# Patient Record
Sex: FEMALE | Race: WHITE | Employment: FULL TIME
[De-identification: names, ages, dates, MRNs, and addresses within clinical notes are randomized per-mention and may not be internally consistent; named-entity substitution may affect disease eponyms.]

---

## 2017-02-01 ENCOUNTER — TELEPHONE (OUTPATIENT)
Dept: CARDIOLOGY | Facility: CLINIC | Age: 57
End: 2017-02-01

## 2017-05-22 ENCOUNTER — OFFICE VISIT (OUTPATIENT)
Dept: CARDIOLOGY | Age: 57
End: 2017-05-22
Payer: COMMERCIAL

## 2017-05-22 VITALS
OXYGEN SATURATION: 98 % | HEIGHT: 68 IN | DIASTOLIC BLOOD PRESSURE: 75 MMHG | WEIGHT: 177.4 LBS | HEART RATE: 87 BPM | SYSTOLIC BLOOD PRESSURE: 114 MMHG | BODY MASS INDEX: 26.89 KG/M2

## 2017-05-22 DIAGNOSIS — I25.10 ASHD (ARTERIOSCLEROTIC HEART DISEASE): Primary | ICD-10-CM

## 2017-05-22 DIAGNOSIS — E78.5 DYSLIPIDEMIA: ICD-10-CM

## 2017-05-22 DIAGNOSIS — Z82.49 FAMILY HISTORY OF PREMATURE CAD: ICD-10-CM

## 2017-05-22 DIAGNOSIS — Z95.820 S/P ANGIOPLASTY WITH STENT: ICD-10-CM

## 2017-05-22 PROCEDURE — 93000 ELECTROCARDIOGRAM COMPLETE: CPT | Performed by: INTERNAL MEDICINE

## 2017-05-22 PROCEDURE — 99214 OFFICE O/P EST MOD 30 MIN: CPT | Performed by: INTERNAL MEDICINE

## 2017-05-22 RX ORDER — ROSUVASTATIN CALCIUM 40 MG/1
40 TABLET, COATED ORAL EVERY EVENING
Qty: 30 TABLET | Refills: 3 | Status: SHIPPED | OUTPATIENT
Start: 2017-05-22 | End: 2017-09-26 | Stop reason: CLARIF

## 2017-09-26 ENCOUNTER — TELEPHONE (OUTPATIENT)
Dept: CARDIOLOGY | Age: 57
End: 2017-09-26

## 2017-09-26 RX ORDER — ATORVASTATIN CALCIUM 40 MG/1
40 TABLET, FILM COATED ORAL DAILY
Qty: 90 TABLET | Refills: 3 | Status: SHIPPED | OUTPATIENT
Start: 2017-09-26 | End: 2018-08-09 | Stop reason: SDUPTHER

## 2017-11-14 RX ORDER — CLOPIDOGREL BISULFATE 75 MG/1
TABLET ORAL
Qty: 90 TABLET | Refills: 3 | Status: SHIPPED | OUTPATIENT
Start: 2017-11-14 | End: 2018-11-08 | Stop reason: SDUPTHER

## 2018-05-21 ENCOUNTER — OFFICE VISIT (OUTPATIENT)
Dept: CARDIOLOGY | Age: 58
End: 2018-05-21
Payer: COMMERCIAL

## 2018-05-21 VITALS
BODY MASS INDEX: 28.6 KG/M2 | OXYGEN SATURATION: 98 % | HEIGHT: 67 IN | HEART RATE: 75 BPM | SYSTOLIC BLOOD PRESSURE: 126 MMHG | RESPIRATION RATE: 20 BRPM | DIASTOLIC BLOOD PRESSURE: 83 MMHG | WEIGHT: 182.2 LBS

## 2018-05-21 DIAGNOSIS — Z95.820 S/P ANGIOPLASTY WITH STENT: ICD-10-CM

## 2018-05-21 DIAGNOSIS — I25.10 CORONARY ARTERY DISEASE INVOLVING NATIVE CORONARY ARTERY OF NATIVE HEART WITHOUT ANGINA PECTORIS: Primary | ICD-10-CM

## 2018-05-21 DIAGNOSIS — E78.5 HYPERLIPIDEMIA WITH TARGET LDL LESS THAN 100: ICD-10-CM

## 2018-05-21 PROCEDURE — 93000 ELECTROCARDIOGRAM COMPLETE: CPT | Performed by: INTERNAL MEDICINE

## 2018-05-21 PROCEDURE — 99213 OFFICE O/P EST LOW 20 MIN: CPT | Performed by: INTERNAL MEDICINE

## 2018-05-21 RX ORDER — METOPROLOL SUCCINATE 25 MG/1
12.5 TABLET, EXTENDED RELEASE ORAL DAILY
Qty: 90 TABLET | Refills: 3 | Status: SHIPPED | OUTPATIENT
Start: 2018-05-21 | End: 2019-08-12 | Stop reason: SDUPTHER

## 2018-05-21 RX ORDER — NITROGLYCERIN 0.4 MG/1
0.4 TABLET SUBLINGUAL EVERY 5 MIN PRN
Qty: 25 TABLET | Refills: 1 | Status: SHIPPED | OUTPATIENT
Start: 2018-05-21 | End: 2022-10-12 | Stop reason: SDUPTHER

## 2018-05-24 ENCOUNTER — HOSPITAL ENCOUNTER (OUTPATIENT)
Age: 58
Discharge: HOME | End: 2018-05-24
Payer: COMMERCIAL

## 2018-05-24 DIAGNOSIS — Z95.820 S/P ANGIOPLASTY WITH STENT: ICD-10-CM

## 2018-05-24 DIAGNOSIS — I25.10 CORONARY ARTERY DISEASE INVOLVING NATIVE CORONARY ARTERY OF NATIVE HEART WITHOUT ANGINA PECTORIS: ICD-10-CM

## 2018-05-24 DIAGNOSIS — E78.5 HYPERLIPIDEMIA WITH TARGET LDL LESS THAN 100: ICD-10-CM

## 2018-05-24 LAB
ANION GAP SERPL CALCULATED.3IONS-SCNC: 8 MMOL/L (ref 9–17)
BUN BLDV-MCNC: 15 MG/DL (ref 6–20)
BUN/CREAT BLD: 21 (ref 9–20)
CALCIUM SERPL-MCNC: 9.7 MG/DL (ref 8.6–10.4)
CHLORIDE BLD-SCNC: 99 MMOL/L (ref 98–107)
CHOLESTEROL/HDL RATIO: 3.6
CHOLESTEROL: 186 MG/DL
CO2: 30 MMOL/L (ref 20–31)
CREAT SERPL-MCNC: 0.71 MG/DL (ref 0.5–0.9)
GFR AFRICAN AMERICAN: >60 ML/MIN
GFR NON-AFRICAN AMERICAN: >60 ML/MIN
GFR SERPL CREATININE-BSD FRML MDRD: ABNORMAL ML/MIN/{1.73_M2}
GFR SERPL CREATININE-BSD FRML MDRD: ABNORMAL ML/MIN/{1.73_M2}
GLUCOSE BLD-MCNC: 106 MG/DL (ref 70–99)
HCT VFR BLD CALC: 41.4 % (ref 36.3–47.1)
HDLC SERPL-MCNC: 51 MG/DL
HEMOGLOBIN: 13.5 G/DL (ref 11.9–15.1)
LDL CHOLESTEROL: 119 MG/DL (ref 0–130)
MCH RBC QN AUTO: 29.7 PG (ref 25.2–33.5)
MCHC RBC AUTO-ENTMCNC: 32.6 G/DL (ref 28.4–34.8)
MCV RBC AUTO: 91.2 FL (ref 82.6–102.9)
NRBC AUTOMATED: 0 PER 100 WBC
PDW BLD-RTO: 12.7 % (ref 11.8–14.4)
PLATELET # BLD: 226 K/UL (ref 138–453)
PMV BLD AUTO: 9.9 FL (ref 8.1–13.5)
POTASSIUM SERPL-SCNC: 4.2 MMOL/L (ref 3.7–5.3)
RBC # BLD: 4.54 M/UL (ref 3.95–5.11)
SODIUM BLD-SCNC: 137 MMOL/L (ref 135–144)
TRIGL SERPL-MCNC: 80 MG/DL
VLDLC SERPL CALC-MCNC: NORMAL MG/DL (ref 1–30)
WBC # BLD: 5.5 K/UL (ref 3.5–11.3)

## 2018-05-24 PROCEDURE — 80061 LIPID PANEL: CPT

## 2018-05-24 PROCEDURE — 85027 COMPLETE CBC AUTOMATED: CPT

## 2018-05-24 PROCEDURE — 80048 BASIC METABOLIC PNL TOTAL CA: CPT

## 2018-05-24 PROCEDURE — 36415 COLL VENOUS BLD VENIPUNCTURE: CPT

## 2018-06-11 ENCOUNTER — TELEPHONE (OUTPATIENT)
Dept: CARDIOLOGY | Age: 58
End: 2018-06-11

## 2018-06-19 ENCOUNTER — TELEPHONE (OUTPATIENT)
Dept: CARDIOLOGY | Age: 58
End: 2018-06-19

## 2018-08-09 RX ORDER — ATORVASTATIN CALCIUM 40 MG/1
40 TABLET, FILM COATED ORAL DAILY
Qty: 90 TABLET | Refills: 3 | Status: SHIPPED | OUTPATIENT
Start: 2018-08-09 | End: 2018-11-08 | Stop reason: SDUPTHER

## 2018-11-08 RX ORDER — ATORVASTATIN CALCIUM 80 MG/1
80 TABLET, FILM COATED ORAL DAILY
Qty: 90 TABLET | Refills: 3 | Status: SHIPPED | OUTPATIENT
Start: 2018-11-08 | End: 2019-11-11 | Stop reason: SDUPTHER

## 2018-11-08 RX ORDER — CLOPIDOGREL BISULFATE 75 MG/1
TABLET ORAL
Qty: 90 TABLET | Refills: 3 | Status: SHIPPED | OUTPATIENT
Start: 2018-11-08 | End: 2019-11-11 | Stop reason: SDUPTHER

## 2019-04-23 ENCOUNTER — OFFICE VISIT (OUTPATIENT)
Dept: CARDIOLOGY | Age: 59
End: 2019-04-23
Payer: COMMERCIAL

## 2019-04-23 VITALS
RESPIRATION RATE: 18 BRPM | HEART RATE: 85 BPM | OXYGEN SATURATION: 97 % | BODY MASS INDEX: 26.21 KG/M2 | WEIGHT: 167 LBS | DIASTOLIC BLOOD PRESSURE: 76 MMHG | SYSTOLIC BLOOD PRESSURE: 111 MMHG | HEIGHT: 67 IN

## 2019-04-23 DIAGNOSIS — R55 SYNCOPE, UNSPECIFIED SYNCOPE TYPE: ICD-10-CM

## 2019-04-23 DIAGNOSIS — Z82.49 FAMILY HISTORY OF CORONARY ARTERIOSCLEROSIS: ICD-10-CM

## 2019-04-23 DIAGNOSIS — I10 ORTHOSTATIC HYPERTENSION: Primary | ICD-10-CM

## 2019-04-23 DIAGNOSIS — R00.2 PALPITATIONS: ICD-10-CM

## 2019-04-23 DIAGNOSIS — Z95.820 S/P ANGIOPLASTY WITH STENT: ICD-10-CM

## 2019-04-23 DIAGNOSIS — E78.2 MIXED HYPERLIPIDEMIA: ICD-10-CM

## 2019-04-23 DIAGNOSIS — I25.10 ASHD (ARTERIOSCLEROTIC HEART DISEASE): ICD-10-CM

## 2019-04-23 PROCEDURE — 99214 OFFICE O/P EST MOD 30 MIN: CPT | Performed by: INTERNAL MEDICINE

## 2019-04-23 PROCEDURE — 93000 ELECTROCARDIOGRAM COMPLETE: CPT | Performed by: INTERNAL MEDICINE

## 2019-04-23 NOTE — PROGRESS NOTES
Syed Young am scribing for and in the presence of Alberto Higgins MD, F.A.C.C. Patient: Brandin Bliss  : 1960  Date of Visit: 2019    REASON FOR VISIT / CONSULTATION:   Chief Complaint   Patient presents with    Follow-up     HX: CAD S/p Stent, Hyperlipidemia. Pt here for syncope. Pt staes she is doing ok. She was standing doing dishes and she felt dizzy in a way and down she went. Her feriance caugt her so she did not hit her head. Her BP was 109/50. Last time she passed out she was a teenager. When she came to her heart racing. Deneis: CP, Palpitioatns, Lightreaded/dizziness, SOB. I had the pleasure of seeing Brandin Bliss today who comes in for follow up of her coronary artery disease     She is s/p PCI with HITESH to left anterior descending coronary artery and LCx in 2013 for unstable angina. Symptoms prior to her PCI included recurrent chest pain. She did well since her PCI. Working full time. Compliant with medications. History of intolerance to Crestor causing muscle pain. Tolerating Lipitor without significant problem. She has no history of MI or CHF. She is here for her  follow up after syncope episode. She states she passed out completely on 19 after dinner while doing dishes, her fiance caught her and when she woke up her heart was rapidly beating. She denies any chest pain, vomiting, seizure activity, loss of bladder or bowel continence. He also denied biting her tongue. After she woke up, her fiancé took her blood pressure 109/50 and Blood Sugar was 149. No further passing out episodes since that time. No significant dizziness or lightheadedness. No Chest pain, pressure or tightness. No significant shortness of breath. No problems with medications. She states that she is drinking enough fluids. ECG showed  showed normal sinus rhythm, no ischemic changes. Positive orthostatic  blood pressure changes in his office today.     Past Medical History:   Diagnosis Date    Abnormal cardiac cath 10/3/13    LMCA:  Mild [id number]%. LAD:  Abnormal.  Hazy 60-70% proximal LAD stenosis at the bifurcation of a moderate sized septal peforator vessel. LCx:  Abnormal.  80-90% proximal circumflex stenosis. .  RCA:  Abnormal.  90% ostial stenosis in a relatively small and distal acute marginal branch of the RCA.  Angina pectoris ([address]     CAD (coronary artery disease), native coronary artery 10/3/13    90% Proximal Cx. 70% proximal LAD. 80% small marginal branch stenosis of the RCA.  Esophageal reflux     H/O cardiovascular stress test 9/20/13    Probably normal myocardial perfusion imaging with soft tissue artifact but without evidence of significant MI or infarction. LV systolic function was normal without regional wall motion abnormalities. EF 66%. significant electrocardiographic evidence of MI during EKG monitoring without significant associated arrhythmias. Maximum ST depression was 1.75 mm in lead V4. Duke Treadmill score is 0.    H/O cardiovascular stress test 9/20/13    which correlates with an intermediate risk for CAD.  History of echocardiogram 05/18/15    EF 60%. LV cavity size is normal. No wall motion abnormalities or valvular abnormalities. No evidence of diastolic dysfunction was seen.  Hypercholesterolemia     Impaired fasting glucose     S/P angioplasty 10/4/13    Drug Eluting Stent. LAD and/or branches, CX and /or branches.    .     Social History     Tobacco Use    Smoking status: Never Smoker    Smokeless tobacco: Never Used   Substance Use Topics    Alcohol use: No    Drug use: No     No significant family history of recurrent syncope       Current Outpatient Medications:     clopidogrel (PLAVIX) 75 MG tablet, TAKE ONE TABLET BY MOUTH ONCE DAILY, Disp: 90 tablet, Rfl: 3    atorvastatin (LIPITOR) 80 MG tablet, Take 1 tablet by mouth daily, Disp: 90 tablet, Rfl: 3    nitroGLYCERIN (NITROSTAT) 0.4 MG SL tablet, Place 1 tablet under the tongue every 5 minutes as needed (Take when needed), Disp: 25 tablet, Rfl: 1    metoprolol succinate (TOPROL XL) 25 MG extended release tablet, Take 0.5 tablets by mouth daily, Disp: 90 tablet, Rfl: 3    aspirin 81 MG tablet, Take 81 mg by mouth daily. , Disp: , Rfl:      Allergies   Allergen Reactions    Crestor [Rosuvastatin Calcium] Other (See Comments)     Body aches       ROS: 14 systems reviewed and negative except for pertinent positives as noted above. Vitals:    04/23/19 0949   BP: 111/76   Pulse: 85   Resp:    SpO2:         Body mass index is 26.16 kg/m². Constitutional: She is oriented to person, place, and time. She appears well-developed and well-nourished. In no acute distress. HEENT: Normocephalic and atraumatic. No JVD present. Carotid bruit is not present. No mass and no thyromegaly present. No lymphadenopathy present. Cardiovascular: Normal rate, regular rhythm, normal heart sounds. Exam reveals no gallop and no friction rubs. No murmur was heard. .  Pulmonary/Chest: Effort normal and breath sounds normal. No respiratory distress. She has no wheezes, rhonchi or rales. Abdominal: Soft, non-tender. Bowel sounds and aorta are normal. She exhibits no organomegaly, mass or bruit. Extremities: None. No cyanosis or clubbing. 2+ radial and carotid pulses. Distal extremity pulses: 2+ bilaterally. Neurological: She is alert and oriented to person, place, and time. No evidence of gross cranial nerve deficit. Coordination appeared normal.   Skin: Skin is warm and dry. There is no rash or diaphoresis. Psychiatric: She has a normal mood and affect. Her speech is normal and behavior is normal.      Lab reviewed. Diagnosis Orders   1. Orthostatic hypertension  Echo 2D w doppler w color complete    Tilt table test    Stress test myoview    Lipid Panel   2.  Syncope, unspecified syncope type  Echo 2D w doppler w color complete    Tilt table test    Stress test myoview    Lipid Panel   3. ASHD (arteriosclerotic heart disease)  EKG 12 lead    Echo 2D w doppler w color complete    Tilt table test    Stress test myoview    Lipid Panel   4. Palpitations  Echo 2D w doppler w color complete    Tilt table test    Stress test myoview    Lipid Panel   5. Mixed hyperlipidemia  Echo 2D w doppler w color complete    Tilt table test    Stress test myoview    Lipid Panel   6. Family history of coronary arteriosclerosis  Lipid Panel   7. S/P angioplasty with stent  Lipid Panel         Plan:    · History of syncope, probably secondary to orthostatic hypotension. · Positive orthostatic blood pressure changes in the office today. · ECG today showed no acute ischemic changes. · Giving patient history of   CAD further cardiac workup is needed as outlined below. · Because of her intermittent lightheadedness and dizziness, I reminded her to try and keep herself well-hydrated and to take extra time when moving from laying to sitting, sitting to standing and standing to walking. · Additional Testing: I Ordered an Echocardiogram to assess Ms. Arauz's ejection fraction and to look for significant valvular heart disease as a source of Ms. Arauz symptoms  · Additional Testing: I ordered a tilt table test to try and better assess the etiology of Ms. Arauz's recent symptoms   · I will also order a treadmill Myoview stress test to rule out ischemic etiology of her most recent episode of syncope      Atherosclerotic Heart Disease: S/P Stents  Antiplatelet Agent: Continue Aspirin 81 mg daily and clopidogrel (Plavix) 75 mg daily. I also reminded her to watch for signs of blood in her stool or black tarry stools and stop the medication immediately if this develops as this could be life threatening.    Beta Blocker: Continue metoprolol succinate (Toprol XL) 12.5 mg nightly  I also discussed the potential side effects of this medication including lightheadedness and dizziness and told her to stop the medication of this occurs and call our office if this occurs. Statin Therapy: Continue atorvastatin (Lipitor) 40 mg nightly. Heart palpitations: Stress/Anxiety Induced. · Beta Blocker: Continue metoprolol succinate (Toprol XL) 12.5 mg nightly        · Hyperlipidemia: Mixed  · Statin Therapy: Continue atorvastatin (Lipitor) 40 mg nightly. · PCSK9 inhibitors: Not indicated at this time    Finally, I recommended that she continue her other medications and follow up with you as previously scheduled. FOLLOW UP:  I told Ms. Lis Deleon to call my office if she had any problems, but otherwise told her to Return in about 1 week (around 4/30/2019). However, I would be happy to see her sooner should the need arise. Sincerely,  Dr. Meryl Herron Dr [facility], [address] [place] [address]  Phone: [phone]; Fax: [phone]    I believe that the risk of significant morbidity and mortality related to the patient's current medical conditions are: intermediate-high. The documentation recorded by the scribe, accurately and completely reflects the services I personally performed and the decisions made by me. Shola Coy MD, F.A.C.C.  April 23, 2019

## 2019-04-23 NOTE — PATIENT INSTRUCTIONS
SURVEY:    You may be receiving a survey from [employer] regarding your visit today. Please complete the survey to enable us to provide the highest quality of care to you and your family. If you cannot score us a very good on any question, please call the office to discuss how we could have made your experience a very good one. Thank you.

## 2019-04-24 ENCOUNTER — HOSPITAL ENCOUNTER (OUTPATIENT)
Dept: NON INVASIVE DIAGNOSTICS | Age: 59
Discharge: HOME | End: 2019-04-24
Payer: COMMERCIAL

## 2019-04-24 DIAGNOSIS — R00.2 PALPITATIONS: ICD-10-CM

## 2019-04-24 DIAGNOSIS — I25.10 ASHD (ARTERIOSCLEROTIC HEART DISEASE): ICD-10-CM

## 2019-04-24 DIAGNOSIS — R55 SYNCOPE, UNSPECIFIED SYNCOPE TYPE: ICD-10-CM

## 2019-04-24 DIAGNOSIS — E78.2 MIXED HYPERLIPIDEMIA: ICD-10-CM

## 2019-04-24 DIAGNOSIS — I10 ORTHOSTATIC HYPERTENSION: ICD-10-CM

## 2019-04-24 LAB
LV EF: 60 %
LVEF MODALITY: NORMAL

## 2019-04-24 PROCEDURE — A9500 TC99M SESTAMIBI: HCPCS | Performed by: INTERNAL MEDICINE

## 2019-04-24 PROCEDURE — 93018 CV STRESS TEST I&R ONLY: CPT | Performed by: INTERNAL MEDICINE

## 2019-04-24 PROCEDURE — 93017 CV STRESS TEST TRACING ONLY: CPT

## 2019-04-24 PROCEDURE — 3430000000 HC RX DIAGNOSTIC RADIOPHARMACEUTICAL: Performed by: INTERNAL MEDICINE

## 2019-04-24 PROCEDURE — 93660 TILT TABLE EVALUATION: CPT

## 2019-04-24 PROCEDURE — 78452 HT MUSCLE IMAGE SPECT MULT: CPT | Performed by: INTERNAL MEDICINE

## 2019-04-24 PROCEDURE — 93306 TTE W/DOPPLER COMPLETE: CPT

## 2019-04-24 PROCEDURE — 78452 HT MUSCLE IMAGE SPECT MULT: CPT

## 2019-04-24 PROCEDURE — 93016 CV STRESS TEST SUPVJ ONLY: CPT | Performed by: INTERNAL MEDICINE

## 2019-04-24 RX ADMIN — Medication 10 MILLICURIE: at 09:04

## 2019-04-24 RX ADMIN — Medication 30 MILLICURIE: at 09:04

## 2019-04-25 ENCOUNTER — TELEPHONE (OUTPATIENT)
Dept: CARDIOLOGY | Age: 59
End: 2019-04-25

## 2019-04-25 NOTE — TELEPHONE ENCOUNTER
----- Message from Laura Dutton MD sent at 4/24/2019 11:36 PM EDT -----  Test result normal.  No further action needed.

## 2019-04-25 NOTE — PROCEDURES
[address] [place], [place] [address]                              CARDIAC STRESS TEST    PATIENT NAME: Brianda Garcia                 :        1960  MED REC NO:   [id number]                              ROOM:  ACCOUNT NO:   [de-identified]                           ADMIT DATE: 2019  PROVIDER:     Tashia Gotti    CARDIOVASCULAR DIAGNOSTIC DEPARTMENT    DATE OF STUDY:  2019    ORDERING PROVIDER:  Tashia Gotti MD    PRIMARY CARE PROVIDER:  None    INTERPRETING PHYSICIAN:  Tashia Gotti MD    EXERCISE MYOCARDIAL PERFUSION STRESS TEST REPORT    Rest/stress single-isotope SPECT imaging with exercise stress and gated  SPECT imaging    INDICATION:  Assessment of a cardiac cause of:  Syncope    CLINICAL HISTORY:  The patient is a 63-year-old woman with known  coronary artery disease. Previous cardiac history includes:  CAD, stress test, cardiac  catheterization, PTCA    Other previous history includes:  Palpitations, lightheadedness,  syncope, hypotension    Symptoms just prior to testing included:  None    Relevant medications:  Metoprolol    PROCEDURE:  The patient performed treadmill exercise using a Bruno  protocol, completing 9:41 minutes and completing an estimated workload  of 56.36 metabolic equivalents (METS). The test was terminated due to fatigue and ST depression. The heart rate was 84 beats per minute at baseline and increased to 166  beats per minute at peak exercise, which was 103% of the maximum  predicted heart rate. The rest blood pressure was 106/60 mmHg and  increased to 150/70 mmHg, which is a normal response. During the  procedure, the patient developed fatigue and shortness of breath but  denied any chest discomfort. Myocardial perfusion imaging: Imaging was performed at rest 30-45  minutes following the injection of 10.0 mCi of sestamibi.   At peak  exercise, the patient was injected with 30.0 mCi of sestamibi and  exercise was continued for 1 minute. Gating post-stress tomographic  imaging was performed 30-45 minutes after stress. STRESS ECG RESULTS:  The resting electrocardiogram demonstrated normal  sinus rhythm without definitive ST-segment abnormalities suggestive of  myocardial ischemia. At peak exercise and during recovery, the patient developed:    Upsloping ST segment changes in lead(s) II, III, aVF, V4, V5, V6 which  did meet diagnostic criteria for myocardial ischemia with no premature  atrial contractions (PACs) and no premature ventricular contractions  (PVCs). NUCLEAR IMAGING RESULTS:  The overall quality of the study is fair. No  significant attenuation artifact was seen. There is no evidence of  abnormal lung uptake. Additionally, the right ventricle appears normal.  The left ventricular cavity is noted to be normal in size on the stress  images. There is no evidence of transient ischemic dilatation (TID) of  the left ventricle. Gated SPECT imaging reveals normal myocardial thickening and wall motion  with a calculated left ventricular ejection fraction of 68%. The rest images demonstrated a small/moderate perfusion abnormality of  mild intensity in the apical and anteroapical region(s) which is most  likely due to artifact. On stress imaging, a small/moderate perfusion abnormality of mild  intensity was noted in the apical and anteroapical region(s) which is  most likely due to artifact. IMPRESSION:  1. Largely normal myocardial perfusion imaging with soft tissue artifact  but without evidence of significant myocardial ischemia or infarction. 2.  Global left ventricular systolic function was normal with an EF of  68% without regional wall motion abnormalities. 3.  Significant electrocardiographic evidence of myocardial ischemia  during EKG monitoring without significant associated arrhythmias.     The patient's Duke Treadmill score is 0, which correlates with a low  risk for significant coronary artery disease. Overall, these results are most consistent with a low risk scan. Although the patient's results were not completely normal, unless  clinical suspicion for significant ongoing coronary artery ischemia is  high, I would not suggest pursuing additional testing by coronary  angiography. The sensitivity for detecting ischemia on this test may have been  reduced due to the patient being on a beta blocker. TERRI DAS    D: 04/25/2019 12:27:57       T: 04/25/2019 12:29:02     [email or web address]  Job#: [id number]     Doc#: Unknown

## 2019-04-25 NOTE — PROCEDURES
[address] [place], [place] [address]                                TILT TABLE TEST    PATIENT NAME: María Teresa                 :        1960  MED REC NO:   [id number]                              ROOM:  ACCOUNT NO:   [de-identified]                           ADMIT DATE: 2019  PROVIDER:     Sima Yao    Cardiovascular Diagnostics Department    DATE OF PROCEDURE:  2019    ORDERING PROVIDER:  Sima Yao MD    PRIMARY CARE PROVIDER:  None    INTERPRETING PHYSICIAN:  Sima Yao MD    Diagnosis:  Syncope    PROCEDURE SUMMARY:  After explaining the risk, benefits and alternatives  to the procedure, informed written consent was obtained. The patient  was brought to the tilt table laboratory in a fasting and resting state. The patient was placed on the tilt table in the supine position, ECG  patches were applied and an IV was placed. The patient's baseline blood  pressure was 120/84 mmHg with a heart rate of 75/minute. The patient  was then raised to the 70 degree head upright tilt position with and  pulse rate, blood pressure and cardiac rhythm were monitored and  recorded each minute of the study for a maximum of 30 minutes. During the initial 7 minutes of the study, the patient's blood pressure  ranged from a high of 121/76 mmHg to a low of 67/34 mmHg, while their  heart rate ranged from a low of 66/minute to a high of 90/minute. During this period, the patient reported moderate lightheadedness,  vision changes, feeling as if she were going to pass out . At this point, the patient was returned to the supine position and  monitored for an additional ten minutes. Once the patient felt well  enough to be discharged home, they were discharged home with  instructions to follow up with their primary care physician and/or  cardiologist as previously scheduled.     STUDY CONCLUSIONS:  Abnormal head upright tilt table study. The patient's heart rate, blood  pressure response and symptoms were most consistent with  neurocardiogenic dysfunction. Combined with vigilant maintenance of  euvolemia and maintaining a moderate salt intake, pharmacologic  treatment with Florinef and/or a serotonin selective reuptake inhibitor  (SSRI) such as Lexapro among other treatments have shown some  effectiveness in the treatment of this condition. TERRI DAS    D: 04/25/2019 12:33:23       T: 04/25/2019 12:33:59     [email or web address]  Job#: [id number]     Doc#: Unknown

## 2019-04-29 ENCOUNTER — TELEPHONE (OUTPATIENT)
Dept: CARDIOLOGY | Age: 59
End: 2019-04-29

## 2019-04-29 NOTE — TELEPHONE ENCOUNTER
----- Message from Ron Anderson MD sent at 4/27/2019  1:42 PM EDT -----  Stress test is normal. Tilt table test is abnormal. Will discuss on follow up.  Thank you

## 2019-04-30 ENCOUNTER — HOSPITAL ENCOUNTER (OUTPATIENT)
Age: 59
Discharge: HOME | End: 2019-04-30
Payer: COMMERCIAL

## 2019-04-30 ENCOUNTER — OFFICE VISIT (OUTPATIENT)
Dept: CARDIOLOGY | Age: 59
End: 2019-04-30
Payer: COMMERCIAL

## 2019-04-30 VITALS
WEIGHT: 169 LBS | HEART RATE: 76 BPM | DIASTOLIC BLOOD PRESSURE: 77 MMHG | OXYGEN SATURATION: 94 % | HEIGHT: 67 IN | SYSTOLIC BLOOD PRESSURE: 127 MMHG | RESPIRATION RATE: 18 BRPM | BODY MASS INDEX: 26.53 KG/M2

## 2019-04-30 DIAGNOSIS — R55 SYNCOPE, UNSPECIFIED SYNCOPE TYPE: Primary | ICD-10-CM

## 2019-04-30 DIAGNOSIS — I10 ORTHOSTATIC HYPERTENSION: ICD-10-CM

## 2019-04-30 DIAGNOSIS — I25.10 CORONARY ARTERY DISEASE INVOLVING NATIVE CORONARY ARTERY OF NATIVE HEART WITHOUT ANGINA PECTORIS: ICD-10-CM

## 2019-04-30 DIAGNOSIS — Z95.820 S/P ANGIOPLASTY WITH STENT: ICD-10-CM

## 2019-04-30 DIAGNOSIS — R55 SYNCOPE, UNSPECIFIED SYNCOPE TYPE: ICD-10-CM

## 2019-04-30 DIAGNOSIS — E78.5 HYPERLIPIDEMIA WITH TARGET LDL LESS THAN 100: ICD-10-CM

## 2019-04-30 LAB
ALBUMIN SERPL-MCNC: 4.3 G/DL (ref 3.5–5.2)
ALBUMIN/GLOBULIN RATIO: 1.3 (ref 1–2.5)
ALP BLD-CCNC: 88 U/L (ref 35–104)
ALT SERPL-CCNC: 27 U/L (ref 5–33)
ANION GAP SERPL CALCULATED.3IONS-SCNC: 10 MMOL/L (ref 9–17)
AST SERPL-CCNC: 22 U/L
BILIRUB SERPL-MCNC: 0.64 MG/DL (ref 0.3–1.2)
BUN BLDV-MCNC: 16 MG/DL (ref 6–20)
BUN/CREAT BLD: 20 (ref 9–20)
CALCIUM SERPL-MCNC: 9.7 MG/DL (ref 8.6–10.4)
CHLORIDE BLD-SCNC: 101 MMOL/L (ref 98–107)
CO2: 31 MMOL/L (ref 20–31)
CREAT SERPL-MCNC: 0.79 MG/DL (ref 0.5–0.9)
GFR AFRICAN AMERICAN: >60 ML/MIN
GFR NON-AFRICAN AMERICAN: >60 ML/MIN
GFR SERPL CREATININE-BSD FRML MDRD: NORMAL ML/MIN/{1.73_M2}
GFR SERPL CREATININE-BSD FRML MDRD: NORMAL ML/MIN/{1.73_M2}
GLUCOSE BLD-MCNC: 97 MG/DL (ref 70–99)
HCT VFR BLD CALC: 41 % (ref 36.3–47.1)
HEMOGLOBIN: 13.3 G/DL (ref 11.9–15.1)
MCH RBC QN AUTO: 30.4 PG (ref 25.2–33.5)
MCHC RBC AUTO-ENTMCNC: 32.4 G/DL (ref 28.4–34.8)
MCV RBC AUTO: 93.8 FL (ref 82.6–102.9)
NRBC AUTOMATED: 0 PER 100 WBC
PDW BLD-RTO: 12.8 % (ref 11.8–14.4)
PLATELET # BLD: 212 K/UL (ref 138–453)
PMV BLD AUTO: 9.9 FL (ref 8.1–13.5)
POTASSIUM SERPL-SCNC: 4.2 MMOL/L (ref 3.7–5.3)
RBC # BLD: 4.37 M/UL (ref 3.95–5.11)
SODIUM BLD-SCNC: 142 MMOL/L (ref 135–144)
TOTAL PROTEIN: 7.6 G/DL (ref 6.4–8.3)
WBC # BLD: 5.6 K/UL (ref 3.5–11.3)

## 2019-04-30 PROCEDURE — 85027 COMPLETE CBC AUTOMATED: CPT

## 2019-04-30 PROCEDURE — 80053 COMPREHEN METABOLIC PANEL: CPT

## 2019-04-30 PROCEDURE — 99213 OFFICE O/P EST LOW 20 MIN: CPT | Performed by: INTERNAL MEDICINE

## 2019-04-30 PROCEDURE — 36415 COLL VENOUS BLD VENIPUNCTURE: CPT

## 2019-04-30 RX ORDER — FLUDROCORTISONE ACETATE 0.1 MG/1
0.1 TABLET ORAL EVERY OTHER DAY
Qty: 90 TABLET | Refills: 3 | Status: SHIPPED | OUTPATIENT
Start: 2019-04-30 | End: 2019-05-30

## 2019-04-30 RX ORDER — FLUDROCORTISONE ACETATE 0.1 MG/1
0.1 TABLET ORAL DAILY
Qty: 90 TABLET | Refills: 3 | Status: SHIPPED | OUTPATIENT
Start: 2019-04-30 | End: 2019-04-30 | Stop reason: CLARIF

## 2019-04-30 NOTE — PROGRESS NOTES
Flaquita Carpio am scribing for and in the presence of Jose Luis Kaur MD, F.A.C.C. Patient: Mayur Fernandez  : 1960  Date of Visit: 2019    REASON FOR VISIT / CONSULTATION:   Chief Complaint   Patient presents with    Follow-up     HX: CAD S/P Stnet, Syncope. Tilt, Echo and Stress test done on . Pt states she is doing ok. Denies: Cp, Palptiaotns, Lighteaded/dizziness, SOB. I had the pleasure of seeing Mayur Fernandez today who comes in for follow up of her coronary artery disease     She is s/p PCI with HITESH to left anterior descending coronary artery and LCx in 2013 for unstable angina. Symptoms prior to her PCI included recurrent chest pain. She did well since her PCI. Working full time. Compliant with medications. History of intolerance to Crestor causing muscle pain. Tolerating Lipitor without significant problem. She has no history of MI or CHF. History of loss of consciousness on 19 after dinner while doing dishes, her fiance caught her and when she woke up her heart was rapidly beating. She denied any chest pain, vomiting, seizure activity, loss of bladder or bowel continence. She also denied biting her tongue. After she woke up, her fiancé took her blood pressure 109/50 and Blood Sugar was 149. Ms. Lia Huffman is here today for her one week follow up. No further passing out episodes since that time. No significant dizziness or lightheadedness. No chest pain, pressure or tightness. No significant shortness of breath. No problems with medications. She states that she is drinking enough fluids. Tilt Table Test done on 2019- Abnormal head upright tilt table study. During the initial 7 minutes of the study, the patient's blood pressure ranged from a high of 121/76 mmHg to a low of 67/34 mmHg, while her heart rate ranged from a low of 66/minute to a high of 90/minute.   During this period, the patient reported moderate lightheadedness, vision changes, feeling as if she were going to pass out . Echo done on 4/24/2019- EF>60%. Normal Study. Stress Test done on 4/24/2019- excellent exercise tolerance, exercise duration 9 minutes and 41 seconds. No chest pain at peak exercise or during recovery. Ischemic ECG changes noted but myocardial perfusion was normal. Overall, these results are most consistent with a low risk scan. Past Medical History:   Diagnosis Date    Abnormal cardiac cath 10/3/13    LMCA:  Mild [id number]%. LAD:  Abnormal.  Hazy 60-70% proximal LAD stenosis at the bifurcation of a moderate sized septal peforator vessel. LCx:  Abnormal.  80-90% proximal circumflex stenosis. .  RCA:  Abnormal.  90% ostial stenosis in a relatively small and distal acute marginal branch of the RCA.  Angina pectoris ([address]     CAD (coronary artery disease), native coronary artery 10/3/13    90% Proximal Cx. 70% proximal LAD. 80% small marginal branch stenosis of the RCA.  Esophageal reflux     H/O cardiovascular stress test 9/20/13    Probably normal myocardial perfusion imaging with soft tissue artifact but without evidence of significant MI or infarction. LV systolic function was normal without regional wall motion abnormalities. EF 66%. significant electrocardiographic evidence of MI during EKG monitoring without significant associated arrhythmias. Maximum ST depression was 1.75 mm in lead V4. Duke Treadmill score is 0.    H/O cardiovascular stress test 9/20/13    which correlates with an intermediate risk for CAD.     H/O cardiovascular stress test 04/24/2019    Largerly normal myocardial perfusion imaging with soft tissue artifact but without evidence of signficnat myocardial ischemia or infarction. EF 68%. The pt duke treadmill score is 0, which correlates with a low risk for significant CAD. Overall, these results are most consistent with a low risk scan.      H/O echocardiogram 04/24/2019    EF>60% Normal study  History of echocardiogram 05/18/15    EF 60%. LV cavity size is normal. No wall motion abnormalities or valvular abnormalities. No evidence of diastolic dysfunction was seen.  History of stress test 04/24/2019    Largely normal myocardial perfusion imagaing with soft tissue artifact but without evidence of significant myocardial ischemia    Hx of tilt table evaluation 04/24/2019    Abnormal head upright tilt table study HR BP response and symptoms were most consistent neurocardiogenic dysfunction     Hypercholesterolemia     Impaired fasting glucose     S/P angioplasty 10/4/13    Drug Eluting Stent. LAD and/or branches, CX and /or branches. .     Social History     Tobacco Use    Smoking status: Never Smoker    Smokeless tobacco: Never Used   Substance Use Topics    Alcohol use: No    Drug use: No     No significant family history of recurrent syncope       Current Outpatient Medications:     clopidogrel (PLAVIX) 75 MG tablet, TAKE ONE TABLET BY MOUTH ONCE DAILY, Disp: 90 tablet, Rfl: 3    atorvastatin (LIPITOR) 80 MG tablet, Take 1 tablet by mouth daily, Disp: 90 tablet, Rfl: 3    nitroGLYCERIN (NITROSTAT) 0.4 MG SL tablet, Place 1 tablet under the tongue every 5 minutes as needed (Take when needed), Disp: 25 tablet, Rfl: 1    metoprolol succinate (TOPROL XL) 25 MG extended release tablet, Take 0.5 tablets by mouth daily, Disp: 90 tablet, Rfl: 3    aspirin 81 MG tablet, Take 81 mg by mouth daily. , Disp: , Rfl:      Allergies   Allergen Reactions    Crestor [Rosuvastatin Calcium] Other (See Comments)     Body aches       ROS: 14 systems reviewed and negative except for pertinent positives as noted above. Vitals:    04/30/19 1503   BP: 127/77   Pulse: 76   Resp: 18   SpO2: 94%        Body mass index is 26.47 kg/m². Constitutional: She is oriented to person, place, and time. She appears well-developed and well-nourished. In no acute distress. HEENT: Normocephalic and atraumatic. No JVD present. Carotid bruit is not present. No mass and no thyromegaly present. No lymphadenopathy present. Cardiovascular: Normal rate, regular rhythm, normal heart sounds. Exam reveals no gallop and no friction rubs. No murmur was heard. Pulmonary/Chest: Effort normal and breath sounds normal. No respiratory distress. She has no wheezes, rhonchi or rales. Abdominal: Soft, non-tender. Bowel sounds and aorta are normal. She exhibits no organomegaly, mass or bruit. Extremities: None. No cyanosis or clubbing. 2+ radial and carotid pulses. Distal extremity pulses: 2+ bilaterally. Neurological: She is alert and oriented to person, place, and time. No evidence of gross cranial nerve deficit. Coordination appeared normal.   Skin: Skin is warm and dry. There is no rash or diaphoresis. Psychiatric: She has a normal mood and affect. Her speech is normal and behavior is normal.      ASSESSMENT:    Diagnosis Orders   1. Syncope, unspecified syncope type     2. Coronary artery disease involving native coronary artery of native heart without angina pectoris     3. S/P angioplasty with stent     4. Hyperlipidemia with target LDL less than 100       PLAN:    · Syncope/near syncope of unknown etiology:  Most likely secondary to neurocardiogenic dysfunction. · Nonpharmacologic counseling: Because of her condition, I reminded her to try and keep herself well-hydrated and to take extra time when moving from laying to sitting, sitting to standing and standing to walking. I also explained to her to help improve her symptoms she should include 3 g sodium diet, 1 or 2 L of sports drinks daily, knee-high compressions stockings. · START Florinef (fludrocortisone) 0.1 mg every other day. I explained that this can cause some increased lower extremity edema but usually this is fairly mild. · Explained all of her testing results with her that she had done, and answered all of her and her fiances questions that she had at this time. Did explain to her the possible etiology of her syncope spell. We did discuss medical management at this time. · Giving excellent exercise tolerance, absence of chest pain at peak exercise and normal myocardial perfusion I don't think cardiac catheterization is needed at this point. Atherosclerotic Heart Disease: S/P Stent 2013  Antiplatelet Agent: Continue Aspirin 81 mg daily and clopidogrel (Plavix) 75 mg daily. I also reminded her to watch for signs of blood in her stool or black tarry stools and stop the medication immediately if this develops as this could be life threatening. Beta Blocker: Continue metoprolol succinate (Toprol XL) 12.5 mg nightly  I also discussed the potential side effects of this medication including lightheadedness and dizziness and told her to stop the medication of this occurs and call our office if this occurs. Anti-anginal medications: Continue nitroglycerin 0.4 mg tablets as needed for chest pain. Statin Therapy: Continue atorvastatin (Lipitor) 80 mg nightly. · Hyperlipidemia: Mixed  · Statin Therapy: Continue atorvastatin (Lipitor) 80 mg nightly. Finally, I recommended that she continue her other medications and follow up with you as previously scheduled. FOLLOW UP:  I told Ms. Pauly Mendoza to call my office if she had any problems, but otherwise told her to Return in about 6 weeks (around 6/11/2019). However, I would be happy to see her sooner should the need arise. Sincerely,  Clive [address], [address] [place] [address]  Phone: [phone]; Fax: [phone]    I believe that the risk of significant morbidity and mortality related to the patient's current medical conditions are: Intermediate. The documentation recorded by the scribe, accurately and completely reflects the services I personally performed and the decisions made by me. Arias Jaimes MD, F.A.C.C.  April 30, 2019

## 2019-05-01 ENCOUNTER — TELEPHONE (OUTPATIENT)
Dept: CARDIOLOGY | Age: 59
End: 2019-05-01

## 2019-05-01 NOTE — TELEPHONE ENCOUNTER
----- Message from Lulu Nunes MD sent at 4/30/2019  8:55 PM EDT -----  Test result normal.  No further action needed.

## 2019-05-28 ENCOUNTER — OFFICE VISIT (OUTPATIENT)
Dept: CARDIOLOGY | Age: 59
End: 2019-05-28
Payer: COMMERCIAL

## 2019-05-28 VITALS
HEIGHT: 67 IN | SYSTOLIC BLOOD PRESSURE: 109 MMHG | WEIGHT: 169.4 LBS | HEART RATE: 78 BPM | RESPIRATION RATE: 18 BRPM | DIASTOLIC BLOOD PRESSURE: 69 MMHG | OXYGEN SATURATION: 97 % | BODY MASS INDEX: 26.59 KG/M2

## 2019-05-28 DIAGNOSIS — R55 SYNCOPE, UNSPECIFIED SYNCOPE TYPE: Primary | ICD-10-CM

## 2019-05-28 DIAGNOSIS — E78.5 HYPERLIPIDEMIA WITH TARGET LDL LESS THAN 100: ICD-10-CM

## 2019-05-28 DIAGNOSIS — I25.10 CORONARY ARTERY DISEASE INVOLVING NATIVE CORONARY ARTERY OF NATIVE HEART WITHOUT ANGINA PECTORIS: ICD-10-CM

## 2019-05-28 PROCEDURE — 99213 OFFICE O/P EST LOW 20 MIN: CPT | Performed by: INTERNAL MEDICINE

## 2019-05-28 NOTE — PROGRESS NOTES
Mick Person am scribing for and in the presence of Regan Payne MD, F.A.C.C. Patient: Armen Clayton  : 1960  Date of Visit: May 28, 2019    REASON FOR VISIT / CONSULTATION:   Chief Complaint   Patient presents with    Follow-up     HX: CAD S/p Stent, Syncope, Hyperlipidemia. Pt states she is doing ok. Denies: Syncope, CP, Palptiatons, Lighteaded/dizziness, SOB. I had the pleasure of seeing Armen Clayton today who comes in for follow up of her coronary artery disease     She is s/p PCI with HITESH to left anterior descending coronary artery and LCx in 2013 for unstable angina. Symptoms prior to her PCI included recurrent chest pain. She did well since her PCI. Working full time. Compliant with medications. History of intolerance to Crestor causing muscle pain. Tolerating Lipitor without significant problem. She has no history of MI or CHF. History of loss of consciousness on 19 after dinner while doing dishes, her fiance caught her and when she woke up her heart was rapidly beating. She denied any chest pain, vomiting, seizure activity, loss of bladder or bowel continence. She also denied biting her tongue. After she woke up, her fiancé took her blood pressure 109/50 and Blood Sugar was 149. Tilt Table Test done on 2019- Abnormal head upright tilt table study. During the initial 7 minutes of the study, the patient's blood pressure ranged from a high of 121/76 mmHg to a low of 67/34 mmHg, while her heart rate ranged from a low of 66/minute to a high of 90/minute. During this period, the patient reported moderate lightheadedness, vision changes, feeling as if she were going to pass out. Echo done on 2019- EF>60%. Normal Study. Stress Test done on 2019- excellent exercise tolerance, exercise duration 9 minutes and 41 seconds. No chest pain at peak exercise or during recovery.   Ischemic ECG changes noted but myocardial perfusion was normal. Overall, these results are most consistent with a low risk scan. Ms. Suma Elmore is here today for her one week follow up. No further passing out episodes since that time. No significant dizziness or lightheadedness. No chest pain, pressure or tightness. No significant shortness of breath. No problems with medications. She states that she is drinking enough fluids. Past Medical History:   Diagnosis Date    Abnormal cardiac cath 10/3/13    LMCA:  Mild hhbleubhvjvbct90-96%. LAD:  Abnormal.  Hazy 60-70% proximal LAD stenosis at the bifurcation of a moderate sized septal peforator vessel. LCx:  Abnormal.  80-90% proximal circumflex stenosis. .  RCA:  Abnormal.  90% ostial stenosis in a relatively small and distal acute marginal branch of the RCA.  Angina pectoris ([address]     CAD (coronary artery disease), native coronary artery 10/3/13    90% Proximal Cx. 70% proximal LAD. 80% small marginal branch stenosis of the RCA.  Esophageal reflux     H/O cardiovascular stress test 9/20/13    Probably normal myocardial perfusion imaging with soft tissue artifact but without evidence of significant MI or infarction. LV systolic function was normal without regional wall motion abnormalities. EF 66%. significant electrocardiographic evidence of MI during EKG monitoring without significant associated arrhythmias. Maximum ST depression was 1.75 mm in lead V4. Duke Treadmill score is 0.    H/O cardiovascular stress test 9/20/13    which correlates with an intermediate risk for CAD.     H/O cardiovascular stress test 04/24/2019    Largerly normal myocardial perfusion imaging with soft tissue artifact but without evidence of signficnat myocardial ischemia or infarction. EF 68%. The pt duke treadmill score is 0, which correlates with a low risk for significant CAD. Overall, these results are most consistent with a low risk scan.      H/O echocardiogram 04/24/2019    EF>60% Normal study    History of echocardiogram 05/18/15    EF 60%. LV cavity size is normal. No wall motion abnormalities or valvular abnormalities. No evidence of diastolic dysfunction was seen.  History of stress test 04/24/2019    Largely normal myocardial perfusion imagaing with soft tissue artifact but without evidence of significant myocardial ischemia    Hx of tilt table evaluation 04/24/2019    Abnormal head upright tilt table study HR BP response and symptoms were most consistent neurocardiogenic dysfunction     Hypercholesterolemia     Impaired fasting glucose     S/P angioplasty 10/4/13    Drug Eluting Stent. LAD and/or branches, CX and /or branches. .     Social History     Tobacco Use    Smoking status: Never Smoker    Smokeless tobacco: Never Used   Substance Use Topics    Alcohol use: No    Drug use: No     No significant family history of recurrent syncope       Current Outpatient Medications:     fludrocortisone (FLORINEF) 0.1 MG tablet, Take 1 tablet by mouth every other day, Disp: 90 tablet, Rfl: 3    clopidogrel (PLAVIX) 75 MG tablet, TAKE ONE TABLET BY MOUTH ONCE DAILY, Disp: 90 tablet, Rfl: 3    atorvastatin (LIPITOR) 80 MG tablet, Take 1 tablet by mouth daily, Disp: 90 tablet, Rfl: 3    nitroGLYCERIN (NITROSTAT) 0.4 MG SL tablet, Place 1 tablet under the tongue every 5 minutes as needed (Take when needed), Disp: 25 tablet, Rfl: 1    metoprolol succinate (TOPROL XL) 25 MG extended release tablet, Take 0.5 tablets by mouth daily, Disp: 90 tablet, Rfl: 3    aspirin 81 MG tablet, Take 81 mg by mouth daily. , Disp: , Rfl:      Allergies   Allergen Reactions    Crestor [Rosuvastatin Calcium] Other (See Comments)     Body aches       ROS: 14 systems reviewed and negative except for pertinent positives as noted above. Vitals:    05/28/19 0821   BP: 109/69   Pulse: 78   Resp: 18   SpO2: 97%        Body mass index is 26.53 kg/m². Constitutional: She is oriented to person, place, and time.  She appears well-developed and well-nourished. In no acute distress. HEENT: Normocephalic and atraumatic. No JVD present. Carotid bruit is not present. No mass and no thyromegaly present. No lymphadenopathy present. Cardiovascular: Normal rate, regular rhythm, normal heart sounds. Exam reveals no gallop and no friction rubs. No murmur was heard. Pulmonary/Chest: Effort normal and breath sounds normal. No respiratory distress. She has no wheezes, rhonchi or rales. Abdominal: Soft, non-tender. Bowel sounds and aorta are normal. She exhibits no organomegaly, mass or bruit. Extremities: None. No cyanosis or clubbing. 2+ radial and carotid pulses. Distal extremity pulses: 2+ bilaterally. Neurological: She is alert and oriented to person, place, and time. No evidence of gross cranial nerve deficit. Coordination appeared normal.   Skin: Skin is warm and dry. There is no rash or diaphoresis. Psychiatric: She has a normal mood and affect. Her speech is normal and behavior is normal.      ASSESSMENT:    Diagnosis Orders   1. Syncope, unspecified syncope type     2. Coronary artery disease involving native coronary artery of native heart without angina pectoris     3. Hyperlipidemia with target LDL less than 100       PLAN:    · Syncope/near syncope of unknown etiology:  Most likely secondary to neurocardiogenic dysfunction. · Nonpharmacologic counseling: Because of her condition, I reminded her to try and keep herself well-hydrated and to take extra time when moving from laying to sitting, sitting to standing and standing to walking. I also explained to her to help improve her symptoms she should include 3 g sodium diet, 1 or 2 L of sports drinks daily, knee-high compressions stockings. · Continue Florinef (fludrocortisone) 0.1 mg every other day. Atherosclerotic Heart Disease: S/P Stent 2013  Antiplatelet Agent: Continue Aspirin 81 mg daily and clopidogrel (Plavix) 75 mg daily.  I also reminded her to watch for signs of blood in her stool or black tarry stools and stop the medication immediately if this develops as this could be life threatening. Beta Blocker: Continue metoprolol succinate (Toprol XL) 12.5 mg nightly  I also discussed the potential side effects of this medication including lightheadedness and dizziness and told her to stop the medication of this occurs and call our office if this occurs. Anti-anginal medications: Continue nitroglycerin 0.4 mg tablets as needed for chest pain. Statin Therapy: Continue atorvastatin (Lipitor) 80 mg nightly. · Hyperlipidemia: Mixed  · Statin Therapy: Continue atorvastatin (Lipitor) 80 mg nightly. Finally, I recommended that she continue her other medications and follow up with you as previously scheduled. FOLLOW UP:  I told Ms. Brown Redman to call my office if she had any problems, but otherwise told her to Return in about 6 months (around 11/28/2019). However, I would be happy to see her sooner should the need arise. Sincerely,  Dr. Jaison Elder Dr, [facility], [address] [place] [address]  Phone: [phone]; Fax: [phone]    I believe that the risk of significant morbidity and mortality related to the patient's current medical conditions are: low-intermediate. The documentation recorded by the scribe, accurately and completely reflects the services I personally performed and the decisions made by me. Donavon Gu MD, F.A.C.C.  May 28, 2019

## 2019-05-28 NOTE — PATIENT INSTRUCTIONS
SURVEY:    You may be receiving a survey from [employer] regarding your visit today. Please complete the survey to enable us to provide the highest quality of care to you and your family. If you cannot score us a very good on any question, please call the office to discuss how we could have made your experience a very good one. Thank you.

## 2019-08-12 DIAGNOSIS — E78.5 HYPERLIPIDEMIA WITH TARGET LDL LESS THAN 100: ICD-10-CM

## 2019-08-12 DIAGNOSIS — I25.10 CORONARY ARTERY DISEASE INVOLVING NATIVE CORONARY ARTERY OF NATIVE HEART WITHOUT ANGINA PECTORIS: ICD-10-CM

## 2019-08-12 DIAGNOSIS — Z95.820 S/P ANGIOPLASTY WITH STENT: ICD-10-CM

## 2019-08-12 RX ORDER — METOPROLOL SUCCINATE 25 MG/1
TABLET, EXTENDED RELEASE ORAL
Qty: 45 TABLET | Refills: 7 | Status: SHIPPED | OUTPATIENT
Start: 2019-08-12 | End: 2020-11-10

## 2019-11-05 ENCOUNTER — HOSPITAL ENCOUNTER (EMERGENCY)
Age: 59
Discharge: HOME | End: 2019-11-05
Payer: COMMERCIAL

## 2019-11-05 VITALS
TEMPERATURE: 97.6 F | WEIGHT: 160 LBS | SYSTOLIC BLOOD PRESSURE: 151 MMHG | DIASTOLIC BLOOD PRESSURE: 76 MMHG | OXYGEN SATURATION: 98 % | BODY MASS INDEX: 25.11 KG/M2 | HEART RATE: 77 BPM | HEIGHT: 67 IN | RESPIRATION RATE: 18 BRPM

## 2019-11-05 DIAGNOSIS — M62.838 SPASM OF MUSCLE: Primary | ICD-10-CM

## 2019-11-05 DIAGNOSIS — S39.012A BACK STRAIN, INITIAL ENCOUNTER: ICD-10-CM

## 2019-11-05 PROCEDURE — 6360000002 HC RX W HCPCS: Performed by: PHYSICIAN ASSISTANT

## 2019-11-05 PROCEDURE — 6370000000 HC RX 637 (ALT 250 FOR IP): Performed by: PHYSICIAN ASSISTANT

## 2019-11-05 PROCEDURE — 96372 THER/PROPH/DIAG INJ SC/IM: CPT

## 2019-11-05 PROCEDURE — 99282 EMERGENCY DEPT VISIT SF MDM: CPT

## 2019-11-05 RX ORDER — OXYCODONE HYDROCHLORIDE AND ACETAMINOPHEN 5; 325 MG/1; MG/1
1 TABLET ORAL ONCE
Status: COMPLETED | OUTPATIENT
Start: 2019-11-05 | End: 2019-11-05

## 2019-11-05 RX ORDER — NAPROXEN 500 MG/1
500 TABLET ORAL 2 TIMES DAILY
Qty: 14 TABLET | Refills: 0 | Status: SHIPPED | OUTPATIENT
Start: 2019-11-05 | End: 2019-12-03 | Stop reason: ALTCHOICE

## 2019-11-05 RX ORDER — CYCLOBENZAPRINE HCL 10 MG
10 TABLET ORAL 3 TIMES DAILY PRN
Qty: 21 TABLET | Refills: 0 | Status: SHIPPED | OUTPATIENT
Start: 2019-11-05 | End: 2019-11-15

## 2019-11-05 RX ORDER — KETOROLAC TROMETHAMINE 30 MG/ML
30 INJECTION, SOLUTION INTRAMUSCULAR; INTRAVENOUS ONCE
Status: COMPLETED | OUTPATIENT
Start: 2019-11-05 | End: 2019-11-05

## 2019-11-05 RX ORDER — ORPHENADRINE CITRATE 30 MG/ML
60 INJECTION INTRAMUSCULAR; INTRAVENOUS ONCE
Status: COMPLETED | OUTPATIENT
Start: 2019-11-05 | End: 2019-11-05

## 2019-11-05 RX ADMIN — KETOROLAC TROMETHAMINE 30 MG: 30 INJECTION, SOLUTION INTRAMUSCULAR at 19:30

## 2019-11-05 RX ADMIN — ORPHENADRINE CITRATE 60 MG: 30 INJECTION INTRAMUSCULAR; INTRAVENOUS at 19:30

## 2019-11-05 RX ADMIN — OXYCODONE HYDROCHLORIDE AND ACETAMINOPHEN 1 TABLET: 5; 325 TABLET ORAL at 19:30

## 2019-11-05 ASSESSMENT — PAIN SCALES - GENERAL
PAINLEVEL_OUTOF10: 3
PAINLEVEL_OUTOF10: 9

## 2019-11-05 ASSESSMENT — PAIN DESCRIPTION - PAIN TYPE: TYPE: ACUTE PAIN;CHRONIC PAIN

## 2019-11-05 ASSESSMENT — PAIN DESCRIPTION - ORIENTATION: ORIENTATION: LOWER

## 2019-11-12 RX ORDER — CLOPIDOGREL BISULFATE 75 MG/1
TABLET ORAL
Qty: 90 TABLET | Refills: 3 | Status: SHIPPED | OUTPATIENT
Start: 2019-11-12 | End: 2020-11-09 | Stop reason: SDUPTHER

## 2019-11-12 RX ORDER — ATORVASTATIN CALCIUM 80 MG/1
TABLET, FILM COATED ORAL
Qty: 90 TABLET | Refills: 3 | Status: SHIPPED | OUTPATIENT
Start: 2019-11-12 | End: 2020-11-09

## 2019-12-03 ENCOUNTER — OFFICE VISIT (OUTPATIENT)
Dept: CARDIOLOGY | Age: 59
End: 2019-12-03
Payer: COMMERCIAL

## 2019-12-03 ENCOUNTER — HOSPITAL ENCOUNTER (OUTPATIENT)
Age: 59
Discharge: HOME | End: 2019-12-03
Payer: COMMERCIAL

## 2019-12-03 VITALS
HEART RATE: 79 BPM | SYSTOLIC BLOOD PRESSURE: 115 MMHG | DIASTOLIC BLOOD PRESSURE: 68 MMHG | BODY MASS INDEX: 27.09 KG/M2 | HEIGHT: 67 IN | OXYGEN SATURATION: 98 % | WEIGHT: 172.6 LBS | RESPIRATION RATE: 18 BRPM

## 2019-12-03 DIAGNOSIS — R55 SYNCOPE, UNSPECIFIED SYNCOPE TYPE: ICD-10-CM

## 2019-12-03 DIAGNOSIS — R55 SYNCOPE, UNSPECIFIED SYNCOPE TYPE: Primary | ICD-10-CM

## 2019-12-03 DIAGNOSIS — E78.2 MIXED HYPERLIPIDEMIA: ICD-10-CM

## 2019-12-03 DIAGNOSIS — I25.10 CORONARY ARTERY DISEASE INVOLVING NATIVE CORONARY ARTERY OF NATIVE HEART WITHOUT ANGINA PECTORIS: ICD-10-CM

## 2019-12-03 LAB
CHOLESTEROL/HDL RATIO: 3.5
CHOLESTEROL: 189 MG/DL
HDLC SERPL-MCNC: 54 MG/DL
LDL CHOLESTEROL: 113 MG/DL (ref 0–130)
TRIGL SERPL-MCNC: 110 MG/DL
VLDLC SERPL CALC-MCNC: NORMAL MG/DL (ref 1–30)

## 2019-12-03 PROCEDURE — 80061 LIPID PANEL: CPT

## 2019-12-03 PROCEDURE — 99213 OFFICE O/P EST LOW 20 MIN: CPT | Performed by: INTERNAL MEDICINE

## 2019-12-03 PROCEDURE — 36415 COLL VENOUS BLD VENIPUNCTURE: CPT

## 2019-12-08 RX ORDER — EZETIMIBE 10 MG/1
10 TABLET ORAL DAILY
Qty: 90 TABLET | Refills: 1 | Status: SHIPPED | OUTPATIENT
Start: 2019-12-08 | End: 2020-06-08

## 2019-12-09 ENCOUNTER — TELEPHONE (OUTPATIENT)
Dept: CARDIOLOGY | Age: 59
End: 2019-12-09

## 2019-12-16 RX ORDER — FLUDROCORTISONE ACETATE 0.1 MG/1
0.1 TABLET ORAL DAILY
Qty: 30 TABLET | Refills: 3 | Status: SHIPPED | OUTPATIENT
Start: 2019-12-16 | End: 2020-12-15

## 2019-12-16 RX ORDER — FLUDROCORTISONE ACETATE 0.1 MG/1
0.1 TABLET ORAL DAILY
Qty: 90 TABLET | Refills: 0 | Status: SHIPPED | OUTPATIENT
Start: 2019-12-16 | End: 2019-12-16

## 2019-12-29 ENCOUNTER — HOSPITAL ENCOUNTER (EMERGENCY)
Age: 59
Discharge: HOME | End: 2019-12-29
Payer: COMMERCIAL

## 2019-12-29 ENCOUNTER — APPOINTMENT (OUTPATIENT)
Dept: GENERAL RADIOLOGY | Age: 59
End: 2019-12-29
Payer: COMMERCIAL

## 2019-12-29 VITALS
DIASTOLIC BLOOD PRESSURE: 67 MMHG | RESPIRATION RATE: 18 BRPM | HEART RATE: 85 BPM | OXYGEN SATURATION: 97 % | SYSTOLIC BLOOD PRESSURE: 131 MMHG | TEMPERATURE: 99 F

## 2019-12-29 LAB
-: ABNORMAL
ABSOLUTE EOS #: 0 K/UL (ref 0–0.44)
ABSOLUTE IMMATURE GRANULOCYTE: 0 K/UL (ref 0–0.3)
ABSOLUTE LYMPH #: 0.37 K/UL (ref 1.1–3.7)
ABSOLUTE MONO #: 0.25 K/UL (ref 0.1–1.2)
ALBUMIN SERPL-MCNC: 4.1 G/DL (ref 3.5–5.2)
ALBUMIN/GLOBULIN RATIO: 1.4 (ref 1–2.5)
ALP BLD-CCNC: 80 U/L (ref 35–104)
ALT SERPL-CCNC: 30 U/L (ref 5–33)
AMORPHOUS: ABNORMAL
ANION GAP SERPL CALCULATED.3IONS-SCNC: 13 MMOL/L (ref 9–17)
AST SERPL-CCNC: 29 U/L
BACTERIA: ABNORMAL
BASOPHILS # BLD: 0 % (ref 0–2)
BASOPHILS ABSOLUTE: 0 K/UL (ref 0–0.2)
BILIRUB SERPL-MCNC: 0.85 MG/DL (ref 0.3–1.2)
BILIRUBIN URINE: NEGATIVE
BUN BLDV-MCNC: 16 MG/DL (ref 6–20)
BUN/CREAT BLD: 27 (ref 9–20)
CALCIUM SERPL-MCNC: 8.8 MG/DL (ref 8.6–10.4)
CASTS UA: ABNORMAL /LPF
CHLORIDE BLD-SCNC: 102 MMOL/L (ref 98–107)
CO2: 24 MMOL/L (ref 20–31)
COLOR: YELLOW
COMMENT UA: ABNORMAL
CREAT SERPL-MCNC: 0.6 MG/DL (ref 0.5–0.9)
CRYSTALS, UA: ABNORMAL /HPF
DIFFERENTIAL TYPE: ABNORMAL
DIRECT EXAM: NORMAL
EOSINOPHILS RELATIVE PERCENT: 0 % (ref 1–4)
EPITHELIAL CELLS UA: ABNORMAL /HPF (ref 0–25)
GFR AFRICAN AMERICAN: >60 ML/MIN
GFR NON-AFRICAN AMERICAN: >60 ML/MIN
GFR SERPL CREATININE-BSD FRML MDRD: ABNORMAL ML/MIN/{1.73_M2}
GFR SERPL CREATININE-BSD FRML MDRD: ABNORMAL ML/MIN/{1.73_M2}
GLUCOSE BLD-MCNC: 120 MG/DL (ref 70–99)
GLUCOSE URINE: NEGATIVE
HCT VFR BLD CALC: 39.3 % (ref 36.3–47.1)
HEMOGLOBIN: 13 G/DL (ref 11.9–15.1)
IMMATURE GRANULOCYTES: 0 %
KETONES, URINE: ABNORMAL
LEUKOCYTE ESTERASE, URINE: NEGATIVE
LYMPHOCYTES # BLD: 6 % (ref 24–43)
Lab: NORMAL
MCH RBC QN AUTO: 30.6 PG (ref 25.2–33.5)
MCHC RBC AUTO-ENTMCNC: 33.1 G/DL (ref 28.4–34.8)
MCV RBC AUTO: 92.5 FL (ref 82.6–102.9)
MONOCYTES # BLD: 4 % (ref 3–12)
MORPHOLOGY: NORMAL
MUCUS: ABNORMAL
NITRITE, URINE: NEGATIVE
NRBC AUTOMATED: 0 PER 100 WBC
OTHER OBSERVATIONS UA: ABNORMAL
PDW BLD-RTO: 12.8 % (ref 11.8–14.4)
PH UA: 6 (ref 5–9)
PLATELET # BLD: 181 K/UL (ref 138–453)
PLATELET ESTIMATE: ABNORMAL
PMV BLD AUTO: 9.5 FL (ref 8.1–13.5)
POTASSIUM SERPL-SCNC: 3.6 MMOL/L (ref 3.7–5.3)
PROTEIN UA: NEGATIVE
RBC # BLD: 4.25 M/UL (ref 3.95–5.11)
RBC # BLD: ABNORMAL 10*6/UL
RBC UA: ABNORMAL /HPF (ref 0–2)
RENAL EPITHELIAL, UA: ABNORMAL /HPF
SEG NEUTROPHILS: 90 % (ref 36–65)
SEGMENTED NEUTROPHILS ABSOLUTE COUNT: 5.58 K/UL (ref 1.5–8.1)
SODIUM BLD-SCNC: 139 MMOL/L (ref 135–144)
SPECIFIC GRAVITY UA: >1.03 (ref 1.01–1.02)
SPECIMEN DESCRIPTION: NORMAL
TOTAL PROTEIN: 7.1 G/DL (ref 6.4–8.3)
TRICHOMONAS: ABNORMAL
TSH SERPL DL<=0.05 MIU/L-ACNC: 1.09 MIU/L (ref 0.3–5)
TURBIDITY: CLEAR
URINE HGB: NEGATIVE
UROBILINOGEN, URINE: NORMAL
WBC # BLD: 6.2 K/UL (ref 3.5–11.3)
WBC # BLD: ABNORMAL 10*3/UL
WBC UA: ABNORMAL /HPF (ref 0–5)
YEAST: ABNORMAL

## 2019-12-29 PROCEDURE — 84443 ASSAY THYROID STIM HORMONE: CPT

## 2019-12-29 PROCEDURE — 2580000003 HC RX 258: Performed by: PHYSICIAN ASSISTANT

## 2019-12-29 PROCEDURE — 80053 COMPREHEN METABOLIC PANEL: CPT

## 2019-12-29 PROCEDURE — 6360000002 HC RX W HCPCS: Performed by: PHYSICIAN ASSISTANT

## 2019-12-29 PROCEDURE — 81001 URINALYSIS AUTO W/SCOPE: CPT

## 2019-12-29 PROCEDURE — 6370000000 HC RX 637 (ALT 250 FOR IP): Performed by: PHYSICIAN ASSISTANT

## 2019-12-29 PROCEDURE — 96374 THER/PROPH/DIAG INJ IV PUSH: CPT

## 2019-12-29 PROCEDURE — 71046 X-RAY EXAM CHEST 2 VIEWS: CPT

## 2019-12-29 PROCEDURE — 99284 EMERGENCY DEPT VISIT MOD MDM: CPT

## 2019-12-29 PROCEDURE — 87804 INFLUENZA ASSAY W/OPTIC: CPT

## 2019-12-29 PROCEDURE — 85025 COMPLETE CBC W/AUTO DIFF WBC: CPT

## 2019-12-29 PROCEDURE — 96361 HYDRATE IV INFUSION ADD-ON: CPT

## 2019-12-29 RX ORDER — 0.9 % SODIUM CHLORIDE 0.9 %
1000 INTRAVENOUS SOLUTION INTRAVENOUS ONCE
Status: COMPLETED | OUTPATIENT
Start: 2019-12-29 | End: 2019-12-29

## 2019-12-29 RX ORDER — ACETAMINOPHEN 500 MG
1000 TABLET ORAL ONCE
Status: COMPLETED | OUTPATIENT
Start: 2019-12-29 | End: 2019-12-29

## 2019-12-29 RX ORDER — ONDANSETRON 4 MG/1
4 TABLET, ORALLY DISINTEGRATING ORAL EVERY 8 HOURS PRN
Qty: 12 TABLET | Refills: 0 | Status: SHIPPED | OUTPATIENT
Start: 2019-12-29

## 2019-12-29 RX ORDER — ONDANSETRON 2 MG/ML
4 INJECTION INTRAMUSCULAR; INTRAVENOUS ONCE
Status: COMPLETED | OUTPATIENT
Start: 2019-12-29 | End: 2019-12-29

## 2019-12-29 RX ORDER — OSELTAMIVIR PHOSPHATE 75 MG/1
75 CAPSULE ORAL 2 TIMES DAILY
Qty: 10 CAPSULE | Refills: 0 | Status: SHIPPED | OUTPATIENT
Start: 2019-12-29 | End: 2020-01-03

## 2019-12-29 RX ORDER — OSELTAMIVIR PHOSPHATE 75 MG/1
75 CAPSULE ORAL ONCE
Status: COMPLETED | OUTPATIENT
Start: 2019-12-29 | End: 2019-12-29

## 2019-12-29 RX ORDER — ONDANSETRON 4 MG/1
4 TABLET, ORALLY DISINTEGRATING ORAL ONCE
Status: COMPLETED | OUTPATIENT
Start: 2019-12-29 | End: 2019-12-29

## 2019-12-29 RX ADMIN — OSELTAMIVIR PHOSPHATE 75 MG: 75 CAPSULE ORAL at 19:47

## 2019-12-29 RX ADMIN — SODIUM CHLORIDE 1000 ML: 9 INJECTION, SOLUTION INTRAVENOUS at 15:37

## 2019-12-29 RX ADMIN — ONDANSETRON 4 MG: 4 TABLET, ORALLY DISINTEGRATING ORAL at 19:47

## 2019-12-29 RX ADMIN — ONDANSETRON 4 MG: 2 INJECTION INTRAMUSCULAR; INTRAVENOUS at 15:37

## 2019-12-29 RX ADMIN — SODIUM CHLORIDE 1000 ML: 9 INJECTION, SOLUTION INTRAVENOUS at 18:01

## 2019-12-29 RX ADMIN — ACETAMINOPHEN 1000 MG: 500 TABLET, FILM COATED ORAL at 17:38

## 2019-12-29 ASSESSMENT — ENCOUNTER SYMPTOMS
DIARRHEA: 0
COUGH: 0
SHORTNESS OF BREATH: 0
SORE THROAT: 0
NAUSEA: 0
BACK PAIN: 0
EYE DISCHARGE: 0
CONSTIPATION: 0
BLOOD IN STOOL: 0
WHEEZING: 0
VOMITING: 0
CHEST TIGHTNESS: 0
EYE REDNESS: 0
ABDOMINAL PAIN: 0
RHINORRHEA: 0

## 2019-12-29 ASSESSMENT — PAIN SCALES - GENERAL: PAINLEVEL_OUTOF10: 7

## 2019-12-29 ASSESSMENT — PAIN DESCRIPTION - PAIN TYPE: TYPE: ACUTE PAIN

## 2019-12-29 NOTE — ED PROVIDER NOTES
[facility] ED  eMERGENCY dEPARTMENT eNCOUnter      Pt Name: Ana Luisa Alex  MRN: [id number]  [place] 1960  Date of evaluation: 12/29/2019  Provider: Gonzalez Zuleta Dr     Chief Complaint   Patient presents with    Fatigue     onset today    Emesis     cant keep anything down    Chills         HISTORY OF PRESENT ILLNESS   (Location/Symptom, Timing/Onset, Context/Setting,Quality, Duration, Modifying Factors, Severity)  Note limiting factors. Ana Luisa Alex is a64 y.o. female who presents to the emergency department with complaints of generalized fatigue onset earlier today. Associated complaints include chills nausea and vomiting. She denies any chest pain or shortness of breath. Denies any headache or dizziness. She reports that everything came on in the middle of the night. She does not take anything for symptoms. She denies any abdominal pain. Denies any neck pain or back pain. She otherwise reports she is healthy. She did not receive a flu shot this year. No other complaints this time. HPI    Nursing Notes werereviewed. REVIEW OF SYSTEMS    (2-9 systems for level 4, 10 or more for level 5)     Review of Systems   Constitutional: Positive for fatigue and fever. Negative for chills and diaphoresis. HENT: Negative for congestion, ear pain, rhinorrhea and sore throat. Eyes: Negative for discharge, redness and visual disturbance. Respiratory: Negative for cough, chest tightness, shortness of breath and wheezing. Cardiovascular: Negative for chest pain and palpitations. Gastrointestinal: Negative for abdominal pain, blood in stool, constipation, diarrhea, nausea and vomiting. Endocrine: Negative for polydipsia, polyphagia and polyuria. Genitourinary: Negative for decreased urine volume, difficulty urinating, dysuria, frequency and hematuria. Musculoskeletal: Positive for arthralgias. Negative for back pain and myalgias.    Skin: Negative consistent with a low risk scan.  H/O echocardiogram 04/24/2019    EF>60% Normal study    History of echocardiogram 05/18/15    EF 60%. LV cavity size is normal. No wall motion abnormalities or valvular abnormalities. No evidence of diastolic dysfunction was seen.  History of stress test 04/24/2019    Largely normal myocardial perfusion imagaing with soft tissue artifact but without evidence of significant myocardial ischemia    Hx of tilt table evaluation 04/24/2019    Abnormal head upright tilt table study HR BP response and symptoms were most consistent neurocardiogenic dysfunction     Hypercholesterolemia     Impaired fasting glucose     S/P angioplasty 10/4/13    Drug Eluting Stent. LAD and/or branches, CX and /or branches. SURGICALHISTORY       Past Surgical History:   Procedure Laterality Date    CORONARY ANGIOPLASTY WITH STENT PLACEMENT  10/13         CURRENT MEDICATIONS       Discharge Medication List as of 12/29/2019  7:37 PM      CONTINUE these medications which have NOT CHANGED    Details   ezetimibe (ZETIA) 10 MG tablet Take 1 tablet by mouth daily, Disp-90 tablet, R-1Normal      clopidogrel (PLAVIX) 75 MG tablet TAKE 1 TABLET BY MOUTH ONCE DAILY, Disp-90 tablet, R-3Normal      atorvastatin (LIPITOR) 80 MG tablet TAKE 1 TABLET BY MOUTH ONCE DAILY, Disp-90 tablet, R-3Normal      metoprolol succinate (TOPROL XL) 25 MG extended release tablet TAKE 1/2 (ONE-HALF) TABLET BY MOUTH ONCE DAILY, Disp-45 tablet, R-7Normal      aspirin 81 MG tablet Take 81 mg by mouth daily. fludrocortisone (FLORINEF) 0.1 MG tablet Take 1 tablet by mouth daily, Disp-30 tablet, R-3Normal      nitroGLYCERIN (NITROSTAT) 0.4 MG SL tablet Place 1 tablet under the tongue every 5 minutes as needed (Take when needed), Disp-25 tablet, R-1Normal             ALLERGIES     Crestor [rosuvastatin calcium]    FAMILY HISTORY     History reviewed. No pertinent family history.        SOCIAL HISTORY       Social History Pupils: Pupils are equal, round, and reactive to light. Neck:      Musculoskeletal: Normal range of motion and neck supple. Thyroid: No thyromegaly. Trachea: No tracheal deviation. Cardiovascular:      Rate and Rhythm: Normal rate and regular rhythm. Pulses: Normal pulses. Heart sounds: No murmur. No friction rub. No gallop. Pulmonary:      Effort: Pulmonary effort is normal. No respiratory distress. Breath sounds: Normal breath sounds. No stridor. No wheezing or rales. Abdominal:      General: Bowel sounds are normal. There is no distension. Palpations: Abdomen is soft. There is no mass. Tenderness: There is no tenderness. There is no guarding or rebound. Hernia: No hernia is present. Musculoskeletal: Normal range of motion. General: No tenderness or deformity. Lymphadenopathy:      Cervical: No cervical adenopathy. Skin:     General: Skin is warm and dry. Findings: No erythema or rash. Neurological:      Mental Status: She is alert and oriented to person, place, and time. Cranial Nerves: No cranial nerve deficit. Motor: No abnormal muscle tone. Deep Tendon Reflexes: Reflexes are normal and symmetric. Reflexes normal.   Psychiatric:         Behavior: Behavior normal.         DIAGNOSTIC RESULTS     EKG: All EKG's are interpreted by the Emergency Department Physician who either signs orCo-signs this chart in the absence of a cardiologist.      RADIOLOGY:   Non-plainfilm images such as CT, Ultrasound and MRI are read by the radiologist. Plain radiographic images are visualized and preliminarily interpreted by the emergency physician with the below findings:      Interpretationper the Radiologist below, if available at the time of this note:    XR CHEST STANDARD (2 VW)   Final Result   No acute abnormality.                ED BEDSIDE ULTRASOUND:   Performed by ED Physician - none    LABS:  Labs Reviewed   CBC WITH AUTO DIFFERENTIAL - Abnormal; Notable for the following components:       Result Value    Seg Neutrophils 90 (*)     Lymphocytes 6 (*)     Eosinophils % 0 (*)     Absolute Lymph # 0.37 (*)     All other components within normal limits   COMPREHENSIVE METABOLIC PANEL - Abnormal; Notable for the following components:    Glucose 120 (*)     Bun/Cre Ratio 27 (*)     Potassium 3.6 (*)     All other components within normal limits   URINE RT REFLEX TO CULTURE - Abnormal; Notable for the following components:    Ketones, Urine 1+ (*)     Specific Gravity, UA >1.030 (*)     All other components within normal limits   MICROSCOPIC URINALYSIS - Abnormal; Notable for the following components:    Bacteria, UA 1+ (*)     Mucus, UA TRACE (*)     All other components within normal limits   RAPID INFLUENZA A/B ANTIGENS   TSH WITHOUT REFLEX       All other labs were within normal range or not returned as of this dictation. EMERGENCY DEPARTMENT COURSE and DIFFERENTIAL DIAGNOSIS/MDM:   Vitals:    Vitals:    12/29/19 1501   BP: 131/67   Pulse: 85   Resp: 18   Temp: 99 °F (37.2 °C)   TempSrc: Oral   SpO2: 97%         MDM  44-year-old female who present secondary to generalized body aches and subjective fever at home. On exam appears that she does not feel well. She is not hypotensive she is not tachycardic she is afebrile here. She has no abdominal tenderness has had some vomiting this morning. At this point will get work-up to include rule out of acute infection dehydration electrolyte imbalance rule out a pneumonia or urinary tract infection influenza she has no other specific complaints she is not meningismus is not cephalopathic      On reevaluation patient still feeling better however I want to get urine from her so we will give her another liter of fluid. On reevaluation patient is resting comfortably she is in no obvious distress. She is feeling much better. Tolerating p.o.   At this point I think she likely has influenza although she is ondansetron (ZOFRAN ODT) 4 MG disintegrating tablet Take 1 tablet by mouth every 8 hours as needed for Nausea or Vomiting, Disp-12 tablet, R-0Normal      oseltamivir (TAMIFLU) 75 MG capsule Take 1 capsule by mouth 2 times daily for 5 days, Disp-10 capsule, R-0Normal             Follow-up:  \[facility]\""  [address]  [phone]    If symptoms worsen, As needed    [facility] [address] [place] [id number] [phone]    Schedule an appointment as soon as possible for a visit in 1 day          Final Impression:   1. Flu-like symptoms    2.  Dehydration               (Please note that portions of this note were completed with a voice recognition program.  Efforts were made to edit the dictations but occasionally words are mis-transcribed.)           Zahira Hollis PA-C  12/29/19 1979

## 2020-04-07 ENCOUNTER — HOSPITAL ENCOUNTER (OUTPATIENT)
Dept: NON INVASIVE DIAGNOSTICS | Age: 60
Discharge: HOME | End: 2020-04-07
Payer: COMMERCIAL

## 2020-04-07 ENCOUNTER — HOSPITAL ENCOUNTER (OUTPATIENT)
Age: 60
Discharge: HOME | End: 2020-04-07
Payer: COMMERCIAL

## 2020-04-07 ENCOUNTER — OFFICE VISIT (OUTPATIENT)
Dept: CARDIOLOGY | Age: 60
End: 2020-04-07
Payer: COMMERCIAL

## 2020-04-07 VITALS
BODY MASS INDEX: 27.21 KG/M2 | RESPIRATION RATE: 18 BRPM | HEIGHT: 67 IN | OXYGEN SATURATION: 98 % | DIASTOLIC BLOOD PRESSURE: 70 MMHG | WEIGHT: 173.4 LBS | HEART RATE: 84 BPM | SYSTOLIC BLOOD PRESSURE: 110 MMHG

## 2020-04-07 LAB
ANION GAP SERPL CALCULATED.3IONS-SCNC: 11 MMOL/L (ref 9–17)
BUN BLDV-MCNC: 15 MG/DL (ref 8–23)
BUN/CREAT BLD: 23 (ref 9–20)
CALCIUM SERPL-MCNC: 9.3 MG/DL (ref 8.6–10.4)
CHLORIDE BLD-SCNC: 102 MMOL/L (ref 98–107)
CHOLESTEROL/HDL RATIO: 3
CHOLESTEROL: 150 MG/DL
CO2: 28 MMOL/L (ref 20–31)
CREAT SERPL-MCNC: 0.66 MG/DL (ref 0.5–0.9)
GFR AFRICAN AMERICAN: >60 ML/MIN
GFR NON-AFRICAN AMERICAN: >60 ML/MIN
GFR SERPL CREATININE-BSD FRML MDRD: ABNORMAL ML/MIN/{1.73_M2}
GFR SERPL CREATININE-BSD FRML MDRD: ABNORMAL ML/MIN/{1.73_M2}
GLUCOSE BLD-MCNC: 119 MG/DL (ref 70–99)
HCT VFR BLD CALC: 41 % (ref 36.3–47.1)
HDLC SERPL-MCNC: 50 MG/DL
HEMOGLOBIN: 13.2 G/DL (ref 11.9–15.1)
LDL CHOLESTEROL: 77 MG/DL (ref 0–130)
MCH RBC QN AUTO: 30.1 PG (ref 25.2–33.5)
MCHC RBC AUTO-ENTMCNC: 32.2 G/DL (ref 28.4–34.8)
MCV RBC AUTO: 93.4 FL (ref 82.6–102.9)
NRBC AUTOMATED: 0 PER 100 WBC
PDW BLD-RTO: 12.4 % (ref 11.8–14.4)
PLATELET # BLD: 207 K/UL (ref 138–453)
PMV BLD AUTO: 9.4 FL (ref 8.1–13.5)
POTASSIUM SERPL-SCNC: 4.3 MMOL/L (ref 3.7–5.3)
RBC # BLD: 4.39 M/UL (ref 3.95–5.11)
SODIUM BLD-SCNC: 141 MMOL/L (ref 135–144)
TRIGL SERPL-MCNC: 113 MG/DL
VLDLC SERPL CALC-MCNC: NORMAL MG/DL (ref 1–30)
WBC # BLD: 5.9 K/UL (ref 3.5–11.3)

## 2020-04-07 PROCEDURE — 80048 BASIC METABOLIC PNL TOTAL CA: CPT

## 2020-04-07 PROCEDURE — 80061 LIPID PANEL: CPT

## 2020-04-07 PROCEDURE — 93226 XTRNL ECG REC<48 HR SCAN A/R: CPT

## 2020-04-07 PROCEDURE — 93225 XTRNL ECG REC<48 HRS REC: CPT

## 2020-04-07 PROCEDURE — 93000 ELECTROCARDIOGRAM COMPLETE: CPT | Performed by: INTERNAL MEDICINE

## 2020-04-07 PROCEDURE — 99214 OFFICE O/P EST MOD 30 MIN: CPT | Performed by: INTERNAL MEDICINE

## 2020-04-07 PROCEDURE — 85027 COMPLETE CBC AUTOMATED: CPT

## 2020-04-07 PROCEDURE — 36415 COLL VENOUS BLD VENIPUNCTURE: CPT

## 2020-04-07 NOTE — PATIENT INSTRUCTIONS
SURVEY:    You may be receiving a survey from [employer] regarding your visit today. Please complete the survey to enable us to provide the highest quality of care to you and your family. If you cannot score us a very good on any question, please call the office to discuss how we could have made your experience a very good one. Thank you.

## 2020-04-07 NOTE — PROGRESS NOTES
Keya Yi am scribing for and in the presence of Hemal Garcia MD, F.A.C.C. Patient: Emmanuel Torres  : 1960  Date of Visit: 2020    REASON FOR VISIT / CONSULTATION:   Chief Complaint   Patient presents with    Follow-up     HX:Syncope,CAD,HLD Pt is here today for palpitaitons she is very stressed out lately. Denies: CP,SOB,lightheaded/dizziness     Dear Dr Ralph Parsons,    I had the pleasure of seeing Emmanuel Torres today who comes in for follow up of her coronary artery disease     She is s/p PCI with HITESH to left anterior descending coronary artery and LCx in 2013 for unstable angina. Symptoms prior to her PCI included recurrent chest pain. She did well since her PCI. Working full time. Compliant with medications. History of intolerance to Crestor causing muscle pain. Tolerating Lipitor without significant problem. She has no history of MI or CHF. History of loss of consciousness on 19 after dinner while doing dishes, her fiance caught her and when she woke up her heart was rapidly beating. She denied any chest pain, vomiting, seizure activity, loss of bladder or bowel continence. She also denied biting her tongue. After she woke up, her fiancé took her blood pressure 109/50 and Blood Sugar was 149. Tilt Table Test done on 2019- Abnormal head upright tilt table study. During the initial 7 minutes of the study, the patient's blood pressure ranged from a high of 121/76 mmHg to a low of 67/34 mmHg, while her heart rate ranged from a low of 66/minute to a high of 90/minute. During this period, the patient reported moderate lightheadedness, vision changes, feeling as if she were going to pass out. Echo done on 2019- EF>60%. Normal Study. Stress Test done on 2019- excellent exercise tolerance, exercise duration 9 minutes and 41 seconds. No chest pain at peak exercise or during recovery.   Ischemic ECG changes noted but myocardial perfusion was ischemia or infarction. EF 68%. The pt duke treadmill score is 0, which correlates with a low risk for significant CAD. Overall, these results are most consistent with a low risk scan.  H/O echocardiogram 04/24/2019    EF>60% Normal study    History of echocardiogram 05/18/15    EF 60%. LV cavity size is normal. No wall motion abnormalities or valvular abnormalities. No evidence of diastolic dysfunction was seen.  History of stress test 04/24/2019    Largely normal myocardial perfusion imagaing with soft tissue artifact but without evidence of significant myocardial ischemia    Hx of tilt table evaluation 04/24/2019    Abnormal head upright tilt table study HR BP response and symptoms were most consistent neurocardiogenic dysfunction     Hypercholesterolemia     Impaired fasting glucose     S/P angioplasty 10/4/13    Drug Eluting Stent. LAD and/or branches, CX and /or branches.    .     Social History     Tobacco Use    Smoking status: Never Smoker    Smokeless tobacco: Never Used   Substance Use Topics    Alcohol use: No    Drug use: No     No significant family history of recurrent syncope       Current Outpatient Medications:     Cranberry 1000 MG CAPS, Take by mouth, Disp: , Rfl:     Nutritional Supplements ([employer]OM[employer] HE[employer] SUPPORT PO), Take by mouth, Disp: , Rfl:     ezetimibe (ZETIA) 10 MG tablet, Take 1 tablet by mouth daily, Disp: 90 tablet, Rfl: 1    clopidogrel (PLAVIX) 75 MG tablet, TAKE 1 TABLET BY MOUTH ONCE DAILY, Disp: 90 tablet, Rfl: 3    atorvastatin (LIPITOR) 80 MG tablet, TAKE 1 TABLET BY MOUTH ONCE DAILY, Disp: 90 tablet, Rfl: 3    metoprolol succinate (TOPROL XL) 25 MG extended release tablet, TAKE 1/2 (ONE-HALF) TABLET BY MOUTH ONCE DAILY (Patient taking differently: Take 12.5 mg by mouth daily ), Disp: 45 tablet, Rfl: 7    nitroGLYCERIN (NITROSTAT) 0.4 MG SL tablet, Place 1 tablet under the tongue every 5 minutes as needed (Take when needed), Disp: 25 tablet, Rfl: 1   aspirin 81 MG tablet, Take 81 mg by mouth daily. , Disp: , Rfl:     ondansetron (ZOFRAN ODT) 4 MG disintegrating tablet, Take 1 tablet by mouth every 8 hours as needed for Nausea or Vomiting (Patient not taking: Reported on 4/7/2020), Disp: 12 tablet, Rfl: 0    fludrocortisone (FLORINEF) 0.1 MG tablet, Take 1 tablet by mouth daily (Patient not taking: Reported on 4/7/2020), Disp: 30 tablet, Rfl: 3     Allergies   Allergen Reactions    Crestor [Rosuvastatin Calcium] Other (See Comments)     Body aches       ROS: 14 systems reviewed and negative except for pertinent positives as noted above. Vitals:    04/07/20 1116   BP: 110/70   Pulse: 84   Resp: 18   SpO2: 98%        Body mass index is 27.16 kg/m². Constitutional: She is oriented to person, place, and time. She appears well-developed and well-nourished. In no acute distress. HEENT: Normocephalic and atraumatic. No JVD present. Carotid bruit is not present. No mass and no thyromegaly present. No lymphadenopathy present. Cardiovascular: Normal rate, regular rhythm, normal heart sounds. Exam reveals no gallop and no friction rubs. No murmur was heard. Pulmonary/Chest: Effort normal and breath sounds normal. No respiratory distress. She has no wheezes, rhonchi or rales. Abdominal: Soft, non-tender. Bowel sounds and aorta are normal. She exhibits no organomegaly, mass or bruit. Extremities: None. No cyanosis or clubbing. 2+ radial and carotid pulses. Distal extremity pulses: 2+ bilaterally. Neurological: She is alert and oriented to person, place, and time. No evidence of gross cranial nerve deficit. Coordination appeared normal.   Skin: Skin is warm and dry. There is no rash or diaphoresis. Psychiatric: She has a normal mood and affect. Her speech is normal and behavior is normal.      ASSESSMENT:    Diagnosis Orders   1.  Coronary artery disease involving native coronary artery of native heart without angina pectoris  EKG 12 lead    Lipid Panel

## 2020-04-09 ENCOUNTER — TELEPHONE (OUTPATIENT)
Dept: CARDIOLOGY | Age: 60
End: 2020-04-09

## 2020-04-09 NOTE — TELEPHONE ENCOUNTER
----- Message from Emma Hogue MD sent at 4/8/2020  5:04 PM EDT -----  Blood work is good. Continue current therapy and follow-up. Please call with questions and/or concerns.   Thank you

## 2020-04-13 LAB
ACQUISITION DURATION: NORMAL S
AVERAGE HEART RATE: 85 BPM
EKG DIAGNOSIS: NORMAL
HOLTER MAX HEART RATE: 134 BPM
HOOKUP DATE: NORMAL
HOOKUP TIME: NORMAL
MAX HEART RATE TIME/DATE: NORMAL
MIN HEART RATE TIME/DATE: NORMAL
MIN HEART RATE: 54 BPM
NUMBER OF QRS COMPLEXES: NORMAL
NUMBER OF SUPRAVENTRICULAR BEATS IN RUNS: 0
NUMBER OF SUPRAVENTRICULAR COUPLETS: 0
NUMBER OF SUPRAVENTRICULAR ECTOPICS: 7
NUMBER OF SUPRAVENTRICULAR ISOLATED BEATS: 7
NUMBER OF SUPRAVENTRICULAR RUNS: 0
NUMBER OF VENTRICULAR BEATS IN RUNS: 0
NUMBER OF VENTRICULAR BIGEMINAL CYCLES: 0
NUMBER OF VENTRICULAR COUPLETS: 0
NUMBER OF VENTRICULAR ECTOPICS: 32
NUMBER OF VENTRICULAR ISOLATED BEATS: 32
NUMBER OF VENTRICULAR RUNS: 0

## 2020-04-24 ENCOUNTER — TELEPHONE (OUTPATIENT)
Dept: CARDIOLOGY | Age: 60
End: 2020-04-24

## 2020-06-08 RX ORDER — EZETIMIBE 10 MG/1
10 TABLET ORAL DAILY
Qty: 90 TABLET | Refills: 3 | Status: SHIPPED | OUTPATIENT
Start: 2020-06-08 | End: 2021-06-01

## 2020-10-07 ENCOUNTER — HOSPITAL ENCOUNTER (OUTPATIENT)
Age: 60
Discharge: HOME | End: 2020-10-07
Payer: COMMERCIAL

## 2020-10-07 ENCOUNTER — OFFICE VISIT (OUTPATIENT)
Dept: CARDIOLOGY | Age: 60
End: 2020-10-07
Payer: COMMERCIAL

## 2020-10-07 VITALS
RESPIRATION RATE: 18 BRPM | DIASTOLIC BLOOD PRESSURE: 66 MMHG | HEART RATE: 70 BPM | OXYGEN SATURATION: 97 % | BODY MASS INDEX: 27.88 KG/M2 | SYSTOLIC BLOOD PRESSURE: 103 MMHG | WEIGHT: 177.6 LBS | HEIGHT: 67 IN

## 2020-10-07 LAB
CHOLESTEROL/HDL RATIO: 3.5
CHOLESTEROL: 158 MG/DL
HDLC SERPL-MCNC: 45 MG/DL
LDL CHOLESTEROL: 89 MG/DL (ref 0–130)
TRIGL SERPL-MCNC: 118 MG/DL
VLDLC SERPL CALC-MCNC: NORMAL MG/DL (ref 1–30)

## 2020-10-07 PROCEDURE — 36415 COLL VENOUS BLD VENIPUNCTURE: CPT

## 2020-10-07 PROCEDURE — 99213 OFFICE O/P EST LOW 20 MIN: CPT | Performed by: INTERNAL MEDICINE

## 2020-10-07 PROCEDURE — 80061 LIPID PANEL: CPT

## 2020-10-07 NOTE — PATIENT INSTRUCTIONS
SURVEY:    You may be receiving a survey from [employer] regarding your visit today. Please complete the survey to enable us to provide the highest quality of care to you and your family. If you cannot score us a very good on any question, please call the office to discuss how we could have made your experience a very good one. Thank you.     Your MA today was Keaton Jean

## 2020-10-07 NOTE — PROGRESS NOTES
Kena Tohmas am scribing for and in the presence of Holly Helton MD, F.A.C.C. Patient: iMgel Ramesh  : 1960  Date of Visit: 2020    REASON FOR VISIT / CONSULTATION:   Chief Complaint   Patient presents with    6 Month Follow-Up     HX:CAD,s/p stents, palpitaitons, HLD Pt is here for 6 month follow up she is doing well Denies:CP,SOB,lightheaded/dizziness,palpitations     Dear Dr Maria Antonia Cooley,    I had the pleasure of seeing Migel Ramesh today who comes in for follow up of her coronary artery disease     She is s/p PCI with HITESH to left anterior descending coronary artery and LCx in 2013 for unstable angina. Symptoms prior to her PCI included recurrent chest pain. She did well since her PCI. Working full time. Compliant with medications. History of intolerance to Crestor causing muscle pain. Tolerating Lipitor without significant problem. She has no history of MI or CHF. History of loss of consciousness on 19 after dinner while doing dishes, her fiance caught her and when she woke up her heart was rapidly beating. She denied any chest pain, vomiting, seizure activity, loss of bladder or bowel continence. She also denied biting her tongue. After she woke up, her fiancé took her blood pressure 109/50 and Blood Sugar was 149. Tilt Table Test done on 2019- Abnormal head upright tilt table study. During the initial 7 minutes of the study, the patient's blood pressure ranged from a high of 121/76 mmHg to a low of 67/34 mmHg, while her heart rate ranged from a low of 66/minute to a high of 90/minute. During this period, the patient reported moderate lightheadedness, vision changes, feeling as if she were going to pass out. Echo done on 2019- EF>60%. Normal Study. .  Stress Test done on 2019- excellent exercise tolerance, exercise duration 9 minutes and 41 seconds. No chest pain at peak exercise or during recovery.   Ischemic ECG changes noted but myocardial perfusion was normal. Overall, these results are most consistent with a low risk scan. EKG done in office 4/7/2020- Shows normal sinus rhythm. No acute ischemic changes    Ms. Guera Farrell is here today for six month follow up. She states she is doing really good other than being under a lot of stress. No significant dizziness or lightheadedness. No further passing out episodes. No emergency room visit, hospital admission or minor procedures since I saw her last time. No chest pain, pressure or tightness. No significant shortness of breath. No problems with medications. She states that she is drinking enough fluids. She staying very active with no significant exertional symptoms. Past Medical History:   Diagnosis Date    Abnormal cardiac cath 10/3/13    LMCA:  Mild [id number]%. LAD:  Abnormal.  Hazy 60-70% proximal LAD stenosis at the bifurcation of a moderate sized septal peforator vessel. LCx:  Abnormal.  80-90% proximal circumflex stenosis. .  RCA:  Abnormal.  90% ostial stenosis in a relatively small and distal acute marginal branch of the RCA.  Angina pectoris ([address]     CAD (coronary artery disease), native coronary artery 10/3/13    90% Proximal Cx. 70% proximal LAD. 80% small marginal branch stenosis of the RCA.  Esophageal reflux     H/O cardiovascular stress test 9/20/13    Probably normal myocardial perfusion imaging with soft tissue artifact but without evidence of significant MI or infarction. LV systolic function was normal without regional wall motion abnormalities. EF 66%. significant electrocardiographic evidence of MI during EKG monitoring without significant associated arrhythmias. Maximum ST depression was 1.75 mm in lead V4.   Duke Treadmill score is 0.    H/O cardiovascular stress test 9/20/13    which correlates with an intermediate risk for CAD.     H/O cardiovascular stress test 04/24/2019    Largerly normal myocardial perfusion imaging with soft tissue artifact but without evidence of signficnat myocardial ischemia or infarction. EF 68%. The pt duke treadmill score is 0, which correlates with a low risk for significant CAD. Overall, these results are most consistent with a low risk scan.  H/O echocardiogram 04/24/2019    EF>60% Normal study    History of echocardiogram 05/18/15    EF 60%. LV cavity size is normal. No wall motion abnormalities or valvular abnormalities. No evidence of diastolic dysfunction was seen.  History of stress test 04/24/2019    Largely normal myocardial perfusion imagaing with soft tissue artifact but without evidence of significant myocardial ischemia    Hx of tilt table evaluation 04/24/2019    Abnormal head upright tilt table study HR BP response and symptoms were most consistent neurocardiogenic dysfunction     Hypercholesterolemia     Impaired fasting glucose     S/P angioplasty 10/4/13    Drug Eluting Stent. LAD and/or branches, CX and /or branches.    .     Social History     Tobacco Use    Smoking status: Never Smoker    Smokeless tobacco: Never Used   Substance Use Topics    Alcohol use: No    Drug use: No     No significant family history of recurrent syncope       Current Outpatient Medications:     ezetimibe (ZETIA) 10 MG tablet, Take 1 tablet by mouth daily, Disp: 90 tablet, Rfl: 3    Cranberry 1000 MG CAPS, Take by mouth, Disp: , Rfl:     Nutritional Supplements ([employer]OM[employer] HE[employer] SUPPORT PO), Take by mouth, Disp: , Rfl:     clopidogrel (PLAVIX) 75 MG tablet, TAKE 1 TABLET BY MOUTH ONCE DAILY, Disp: 90 tablet, Rfl: 3    atorvastatin (LIPITOR) 80 MG tablet, TAKE 1 TABLET BY MOUTH ONCE DAILY, Disp: 90 tablet, Rfl: 3    metoprolol succinate (TOPROL XL) 25 MG extended release tablet, TAKE 1/2 (ONE-HALF) TABLET BY MOUTH ONCE DAILY (Patient taking differently: Take 12.5 mg by mouth daily ), Disp: 45 tablet, Rfl: 7    nitroGLYCERIN (NITROSTAT) 0.4 MG SL tablet, Place 1 tablet under the tongue every 5 minutes as needed (Take when needed), Disp: 25 tablet, Rfl: 1    aspirin 81 MG tablet, Take 81 mg by mouth daily. , Disp: , Rfl:     ondansetron (ZOFRAN ODT) 4 MG disintegrating tablet, Take 1 tablet by mouth every 8 hours as needed for Nausea or Vomiting (Patient not taking: Reported on 4/7/2020), Disp: 12 tablet, Rfl: 0    fludrocortisone (FLORINEF) 0.1 MG tablet, Take 1 tablet by mouth daily (Patient not taking: Reported on 4/7/2020), Disp: 30 tablet, Rfl: 3     Allergies   Allergen Reactions    Crestor [Rosuvastatin Calcium] Other (See Comments)     Body aches       ROS: 14 systems reviewed and negative except for pertinent positives as noted above. Vitals:    10/07/20 0829   BP: 103/66   Pulse: 70   Resp: 18   SpO2: 97%        Body mass index is 27.82 kg/m². Constitutional: She is oriented to person, place, and time. She appears well-developed and well-nourished. In no acute distress. HEENT: Normocephalic and atraumatic. No JVD present. Carotid bruit is not present. No mass and no thyromegaly present. No lymphadenopathy present. Cardiovascular: Normal rate, regular rhythm, normal heart sounds. Exam reveals no gallop and no friction rubs. No murmur was heard. Pulmonary/Chest: Effort normal and breath sounds normal. No respiratory distress. She has no wheezes, rhonchi or rales. Abdominal: Soft, non-tender. Bowel sounds and aorta are normal. She exhibits no organomegaly, mass or bruit. Extremities: None. No cyanosis or clubbing. 2+ radial and carotid pulses. Distal extremity pulses: 2+ bilaterally. Neurological: She is alert and oriented to person, place, and time. No evidence of gross cranial nerve deficit. Coordination appeared normal.   Skin: Skin is warm and dry. There is no rash or diaphoresis. Psychiatric: She has a normal mood and affect. Her speech is normal and behavior is normal.      ASSESSMENT:    Diagnosis Orders   1.  Coronary artery disease involving native coronary artery of native heart without angina pectoris     2. S/P angioplasty with stent     3. Heart palpitations     4. Mixed hyperlipidemia       PLAN:    Atherosclerotic Heart Disease: S/P Stent 2013  Antiplatelet Agent: Continue Aspirin 81 mg daily and clopidogrel (Plavix) 75 mg daily. I also reminded her to watch for signs of blood in her stool or black tarry stools and stop the medication immediately if this develops as this could be life threatening. Beta Blocker: Continue metoprolol succinate (Toprol XL) 12.5 mg nightly  I also discussed the potential side effects of this medication including lightheadedness and dizziness and told her to stop the medication of this occurs and call our office if this occurs. Anti-anginal medications: Continue nitroglycerin 0.4 mg tablets as needed for chest pain. Statin Therapy: Continue atorvastatin (Lipitor) 80 mg nightly. Counseled patient extensively to come to the emergency room if she has any chest pain or worsening shortness of breath.  Persistent Recurrent intermittent palpitations: Rate Control Symptomatic  · Beta Blocker: Continue Metoprolol succinate (Toprol XL) 25 mg 1/2 tab daily. · Calcium Channel Blocker: Not indicated at this time. · Additional Testing List: None     · Hyperlipidemia: Mixed  · Statin Therapy: Continue atorvastatin (Lipitor) 80 mg nightly. · Continue Zetia 10 mg daily. · LDL 77 mg/dL 4/7/2020. Finally, I recommended that she continue her other medications and follow up with you as previously scheduled. FOLLOW UP:  I told Ms. Inder Tim to call my office if she had any problems, but otherwise told her to Return in about 1 year (around 10/7/2021). However, I would be happy to see her sooner should the need arise.        Sincerely,  [address] [facility]  Phone: [phone]; Fax: [phone]    I believe that the risk of significant morbidity and mortality related to the patient's current medical conditions are: Intermediate. The documentation recorded by the scribe, accurately and completely reflects the services I personally performed and the decisions made by me. Katerina Mari MD, F.A.C.C.  October 7, 2020

## 2020-10-08 ENCOUNTER — TELEPHONE (OUTPATIENT)
Dept: CARDIOLOGY | Age: 60
End: 2020-10-08

## 2020-10-08 NOTE — TELEPHONE ENCOUNTER
----- Message from Ronnie Hurtado MD sent at 10/7/2020  9:50 PM EDT -----  Acceptable lipid panel. Continue current therapy and follow up. Please call with questions and/or concerns.  Thank you

## 2020-11-09 RX ORDER — CLOPIDOGREL BISULFATE 75 MG/1
75 TABLET ORAL DAILY
Qty: 90 TABLET | Refills: 3 | Status: SHIPPED | OUTPATIENT
Start: 2020-11-09 | End: 2021-11-04

## 2021-02-22 ENCOUNTER — OFFICE VISIT (OUTPATIENT)
Dept: CARDIOLOGY | Age: 61
End: 2021-02-22
Payer: COMMERCIAL

## 2021-02-22 VITALS
OXYGEN SATURATION: 98 % | DIASTOLIC BLOOD PRESSURE: 70 MMHG | HEART RATE: 80 BPM | RESPIRATION RATE: 18 BRPM | BODY MASS INDEX: 27.78 KG/M2 | SYSTOLIC BLOOD PRESSURE: 111 MMHG | WEIGHT: 177 LBS | HEIGHT: 67 IN

## 2021-02-22 DIAGNOSIS — M25.511 RIGHT SHOULDER PAIN, UNSPECIFIED CHRONICITY: Primary | ICD-10-CM

## 2021-02-22 DIAGNOSIS — I25.10 ASHD (ARTERIOSCLEROTIC HEART DISEASE): ICD-10-CM

## 2021-02-22 DIAGNOSIS — E78.2 MIXED HYPERLIPIDEMIA: ICD-10-CM

## 2021-02-22 PROCEDURE — 99213 OFFICE O/P EST LOW 20 MIN: CPT | Performed by: INTERNAL MEDICINE

## 2021-02-22 PROCEDURE — 93000 ELECTROCARDIOGRAM COMPLETE: CPT | Performed by: INTERNAL MEDICINE

## 2021-02-22 NOTE — PATIENT INSTRUCTIONS
SURVEY:    You may be receiving a survey from [employer] regarding your visit today. Please complete the survey to enable us to provide the highest quality of care to you and your family. If you cannot score us a very good on any question, please call the office to discuss how we could have made your experience a very good one. Thank you.

## 2021-02-22 NOTE — PROGRESS NOTES
Bari Hashimoto am scribing for and in the presence of Elsa Foote MD, F.A.C.C. Patient: Nancy Duke  : 1960  Date of Visit: 2021    REASON FOR VISIT / CONSULTATION:   Chief Complaint   Patient presents with    Follow-up     HX: CAD S/p Stents, HLD, Persistent Palpitations. C/o: Arm/ Shoulder Pain. Denies: CP, Palpitaitons, Lighteaded/dizziness, SOB. Dear Dr Kylah Solis,    I had the pleasure of seeing Nancy Duke today who comes in for follow up of her coronary artery disease     She is s/p PCI with HITESH to left anterior descending coronary artery and LCx in 2013 for unstable angina. Symptoms prior to her PCI included recurrent chest pain. She did well since her PCI. Working full time. Compliant with medications. History of intolerance to Crestor causing muscle pain. Tolerating Lipitor without significant problem. She has no history of MI or CHF. History of loss of consciousness on 19 after dinner while doing dishes, her fiance caught her and when she woke up her heart was rapidly beating. She denied any chest pain, vomiting, seizure activity, loss of bladder or bowel continence. She also denied biting her tongue. After she woke up, her fiancé took her blood pressure 109/50 and Blood Sugar was 149. Tilt Table Test done on 2019- Abnormal head upright tilt table study. During the initial 7 minutes of the study, the patient's blood pressure ranged from a high of 121/76 mmHg to a low of 67/34 mmHg, while her heart rate ranged from a low of 66/minute to a high of 90/minute. During this period, the patient reported moderate lightheadedness, vision changes, feeling as if she were going to pass out. Echo done on 2019- EF>60%. Normal Study. .  Stress Test done on 2019- excellent exercise tolerance, exercise duration 9 minutes and 41 seconds. No chest pain at peak exercise or during recovery.   Ischemic ECG changes noted but myocardial perfusion was normal. Overall, these results are most consistent with a low risk scan. EKG done in office 4/7/2020- Shows normal sinus rhythm. No acute ischemic changes    Ms. Dalia Chandler is here today for a same day apt due to waking up for the last two to three nights with right shoulder/ arm pain. She reports that she will wake up from her sleep and her right arm will numb and tingly. She thinks that she is just sleeping on her arm wrong however she wanted to come in to be sure it was not her heart. She denied any headaches or double vision. She rolls on her back and it goes away. She is not sure how long it lasts due to it being at night. No significant dizziness or lightheadedness. No chest pain, pressure or tightness. No significant shortness of breath. No problems with medications. She staying very active with no significant exertional symptoms. EKG done in office (2/22/2021): Normal sinus rhythm with a HR of 80 bpm.    Past Medical History:   Diagnosis Date    Abnormal cardiac cath 10/3/13    LMCA:  Mild [id number]%. LAD:  Abnormal.  Hazy 60-70% proximal LAD stenosis at the bifurcation of a moderate sized septal peforator vessel. LCx:  Abnormal.  80-90% proximal circumflex stenosis. .  RCA:  Abnormal.  90% ostial stenosis in a relatively small and distal acute marginal branch of the RCA.  Angina pectoris ([address]     CAD (coronary artery disease), native coronary artery 10/3/13    90% Proximal Cx. 70% proximal LAD. 80% small marginal branch stenosis of the RCA.  Esophageal reflux     H/O cardiovascular stress test 9/20/13    Probably normal myocardial perfusion imaging with soft tissue artifact but without evidence of significant MI or infarction. LV systolic function was normal without regional wall motion abnormalities. EF 66%. significant electrocardiographic evidence of MI during EKG monitoring without significant associated arrhythmias.   Maximum ST depression was 1.75 mm in lead Nutritional Supplements (WOMENS HEALTH SUPPORT PO), Take by mouth, Disp: , Rfl:     ondansetron (ZOFRAN ODT) 4 MG disintegrating tablet, Take 1 tablet by mouth every 8 hours as needed for Nausea or Vomiting, Disp: 12 tablet, Rfl: 0    nitroGLYCERIN (NITROSTAT) 0.4 MG SL tablet, Place 1 tablet under the tongue every 5 minutes as needed (Take when needed), Disp: 25 tablet, Rfl: 1    aspirin 81 MG tablet, Take 81 mg by mouth daily. , Disp: , Rfl:      Allergies   Allergen Reactions    Crestor [Rosuvastatin Calcium] Other (See Comments)     Body aches       ROS: 14 systems reviewed and negative except for pertinent positives as noted above. Vitals:    02/22/21 1528   BP: 111/70   Pulse: 80   Resp: 18   SpO2: 98%      Body mass index is 27.72 kg/m². Constitutional: She is oriented to person, place, and time. She appears well-developed and well-nourished. In no acute distress. HEENT: Normocephalic and atraumatic. No JVD present. Carotid bruit is not present. No mass and no thyromegaly present. No lymphadenopathy present. Cardiovascular: Normal rate, regular rhythm, normal heart sounds. Exam reveals no gallop and no friction rubs. No murmur was heard. Pulmonary/Chest: Effort normal and breath sounds normal. No respiratory distress. She has no wheezes, rhonchi or rales. Abdominal: Soft, non-tender. Bowel sounds and aorta are normal. She exhibits no organomegaly, mass or bruit. Extremities: None. No cyanosis or clubbing. 2+ radial and carotid pulses. Distal extremity pulses: 2+ bilaterally. Neurological: She is alert and oriented to person, place, and time. No evidence of gross cranial nerve deficit. Coordination appeared normal.   Skin: Skin is warm and dry. There is no rash or diaphoresis. Psychiatric: She has a normal mood and affect. Her speech is normal and behavior is normal.      ASSESSMENT:    Diagnosis Orders   1. Right shoulder pain, unspecified chronicity     2.  ASHD (arteriosclerotic heart disease)  EKG 12 lead   3. Mixed hyperlipidemia       PLAN:    · Right Shoulder/ Arm Pain and Numbness and Tingling: Normal arm grasp and pull upon physical exam. No double vision or blurred vision. No headaches. Reassured her that this is probably nerve compression/musculoskeletal problem. If her symptoms persist or worsen she can go to the ER my office at any time. Atherosclerotic Heart Disease: S/P Stent 2013  Antiplatelet Agent: Continue Aspirin 81 mg daily and clopidogrel (Plavix) 75 mg daily. I also reminded her to watch for signs of blood in her stool or black tarry stools and stop the medication immediately if this develops as this could be life threatening. Beta Blocker: Continue metoprolol succinate (Toprol XL) 12.5 mg nightly  I also discussed the potential side effects of this medication including lightheadedness and dizziness and told her to stop the medication of this occurs and call our office if this occurs. Anti-anginal medications: Continue nitroglycerin 0.4 mg tablets as needed for chest pain. Statin Therapy: Continue atorvastatin (Lipitor) 80 mg nightly. Counseled patient extensively to come to the emergency room if she has any chest pain or worsening shortness of breath. · Hyperlipidemia: Mixed, LDL 89 mg/dL 10/7/2020   · Statin Therapy: Continue atorvastatin (Lipitor) 80 mg nightly. · Continue Zetia 10 mg daily. Finally, I recommended that she continue her other medications and follow up with you as previously scheduled. FOLLOW UP:  I told Ms. Maricruz Tai to call my office if she had any problems, but otherwise told her to Return in about 8 months (around 10/22/2021). However, I would be happy to see her sooner should the need arise.      Sincerely,  [address], [address] [place] [address]  Phone: [phone]; Fax: [phone]    I believe that the risk of significant morbidity and mortality related to the patient's current medical conditions are: low-intermediate. >15 minutes were spent during prep work, discussion and exam of the patient, and follow up documentation and all of their questions were answered. The documentation recorded by the scribe, accurately and completely reflects the services I personally performed and the decisions made by me. Ryann Rivera MD, F.A.C.C.  February 22, 2021

## 2021-08-23 ENCOUNTER — APPOINTMENT (OUTPATIENT)
Dept: GENERAL RADIOLOGY | Age: 61
End: 2021-08-23
Payer: COMMERCIAL

## 2021-08-23 ENCOUNTER — HOSPITAL ENCOUNTER (EMERGENCY)
Age: 61
Discharge: HOME | End: 2021-08-23
Attending: EMERGENCY MEDICINE
Payer: COMMERCIAL

## 2021-08-23 VITALS
OXYGEN SATURATION: 100 % | RESPIRATION RATE: 19 BRPM | TEMPERATURE: 99.3 F | SYSTOLIC BLOOD PRESSURE: 153 MMHG | HEART RATE: 86 BPM | DIASTOLIC BLOOD PRESSURE: 81 MMHG

## 2021-08-23 DIAGNOSIS — U07.1 COVID-19: Primary | ICD-10-CM

## 2021-08-23 LAB
SARS-COV-2, RAPID: DETECTED
SPECIMEN DESCRIPTION: ABNORMAL

## 2021-08-23 PROCEDURE — 87635 SARS-COV-2 COVID-19 AMP PRB: CPT

## 2021-08-23 PROCEDURE — 6360000002 HC RX W HCPCS: Performed by: EMERGENCY MEDICINE

## 2021-08-23 PROCEDURE — 71045 X-RAY EXAM CHEST 1 VIEW: CPT

## 2021-08-23 PROCEDURE — C9803 HOPD COVID-19 SPEC COLLECT: HCPCS

## 2021-08-23 PROCEDURE — 99283 EMERGENCY DEPT VISIT LOW MDM: CPT

## 2021-08-23 RX ORDER — DEXAMETHASONE 4 MG/1
8 TABLET ORAL ONCE
Status: COMPLETED | OUTPATIENT
Start: 2021-08-23 | End: 2021-08-23

## 2021-08-23 RX ADMIN — DEXAMETHASONE 8 MG: 4 TABLET ORAL at 21:05

## 2021-08-24 ENCOUNTER — CARE COORDINATION (OUTPATIENT)
Dept: CARE COORDINATION | Age: 61
End: 2021-08-24

## 2021-08-24 ASSESSMENT — ENCOUNTER SYMPTOMS
COUGH: 1
VOMITING: 0
DIARRHEA: 0
RHINORRHEA: 0
BACK PAIN: 0
SHORTNESS OF BREATH: 0
EYE REDNESS: 0
COLOR CHANGE: 0

## 2021-08-24 NOTE — ACP (ADVANCE CARE PLANNING)
.Advance Care Planning   Healthcare Decision Maker:      Click here to complete Healthcare Decision Makers including selection of the Healthcare Decision Maker Relationship (ie \"Primary\"). Today we documented decision makers according to patient's preference. Discussed advanced directives. Patient is considering options.

## 2021-08-24 NOTE — ED PROVIDER NOTES
[facility] ED  EMERGENCY DEPARTMENT ENCOUNTER      Pt Name: Taj You  MRN: [id number]  [place] 1960  Date of evaluation: 8/23/2021  Provider: Sanju Harrison DO    CHIEF COMPLAINT       Chief Complaint   Patient presents with    Cough     diagnosed with sinus infection at urgent care yesterday, started on Augmentin, states difficulty breathing while laying flat, feels worse since starting antibiotic          HISTORY OF PRESENT ILLNESS   (Location/Symptom, Timing/Onset, Context/Setting, Quality, Duration, Modifying Factors, Severity)  Note limiting factors. Taj You is a 64 y.o. female who presents to the emergency department Brunilda Felix is a 70-year-old female who presents with URI-like symptoms for 1 week. Patient was seen in urgent care yesterday and diagnosed with a sinus infection without work-up and placed on Augmentin. She states since then her symptoms have gotten worse including a dry cough, chest pain and congestion. She denies any fevers, vomiting or diarrhea. She has no sick contacts. She has no underlying lung disease but does have cardiac stents. She did not get her COVID-19 vaccinations. HPI    Nursing Notes were reviewed. REVIEW OF SYSTEMS    (2-9 systems for level 4, 10 or more for level 5)     Review of Systems   Constitutional: Negative for chills and fever. HENT: Positive for congestion. Negative for rhinorrhea. Eyes: Negative for redness and visual disturbance. Respiratory: Positive for cough. Negative for shortness of breath. Cardiovascular: Positive for chest pain. Negative for leg swelling. Gastrointestinal: Negative for diarrhea and vomiting. Genitourinary: Negative for dysuria and hematuria. Musculoskeletal: Negative for back pain and neck pain. Skin: Negative for color change and wound. Neurological: Negative for weakness and headaches. Psychiatric/Behavioral: Negative for agitation and confusion.        Except as noted above the of significant myocardial ischemia    Hx of tilt table evaluation 04/24/2019    Abnormal head upright tilt table study HR BP response and symptoms were most consistent neurocardiogenic dysfunction     Hypercholesterolemia     Impaired fasting glucose     S/P angioplasty 10/4/13    Drug Eluting Stent. LAD and/or branches, CX and /or branches. SURGICAL HISTORY       Past Surgical History:   Procedure Laterality Date    CORONARY ANGIOPLASTY WITH STENT PLACEMENT  10/13         CURRENT MEDICATIONS       Discharge Medication List as of 8/23/2021 10:00 PM      CONTINUE these medications which have NOT CHANGED    Details   ezetimibe (ZETIA) 10 MG tablet Take 1 tablet by mouth once daily, Disp-90 tablet, R-3Normal      metoprolol succinate (TOPROL XL) 25 MG extended release tablet Take 1/2 (one-half) tablet by mouth once daily, Disp-45 tablet, R-3Normal      atorvastatin (LIPITOR) 80 MG tablet Take 1 tablet by mouth once daily, Disp-90 tablet,R-3Normal      clopidogrel (PLAVIX) 75 MG tablet Take 1 tablet by mouth daily, Disp-90 tablet, R-3Normal      Cranberry 1000 MG CAPS Take by mouthHistorical Med      Nutritional Supplements (WOMENS HEALTH SUPPORT PO) Take by mouthHistorical Med      ondansetron (ZOFRAN ODT) 4 MG disintegrating tablet Take 1 tablet by mouth every 8 hours as needed for Nausea or Vomiting, Disp-12 tablet, R-0Normal      nitroGLYCERIN (NITROSTAT) 0.4 MG SL tablet Place 1 tablet under the tongue every 5 minutes as needed (Take when needed), Disp-25 tablet, R-1Normal      aspirin 81 MG tablet Take 81 mg by mouth daily. ALLERGIES     Crestor [rosuvastatin calcium]    FAMILY HISTORY     History reviewed. No pertinent family history.        SOCIAL HISTORY       Social History     Socioeconomic History    Marital status:      Spouse name: None    Number of children: None    Years of education: None    Highest education level: None   Occupational History    None   Tobacco Use  Smoking status: Never Smoker    Smokeless tobacco: Never Used   Vaping Use    Vaping Use: Never used   Substance and Sexual Activity    Alcohol use: No    Drug use: No    Sexual activity: None   Other Topics Concern    None   Social History Narrative    None     Social Determinants of Health     Financial Resource Strain:     Difficulty of Paying Living Expenses:    Food Insecurity:     Worried About Running Out of Food in the Last Year:     Ran Out of Food in the Last Year:    Transportation Needs:     Lack of Transportation (Medical):  Lack of Transportation (Non-Medical):    Physical Activity:     Days of Exercise per Week:     Minutes of Exercise per Session:    Stress:     Feeling of Stress :    Social Connections:     Frequency of Communication with Friends and Family:     Frequency of Social Gatherings with Friends and Family:     Attends Oriental orthodox Services:     Active Member of Clubs or Organizations:     Attends Club or Organization Meetings:     Marital Status:    Intimate Partner Violence:     Fear of Current or Ex-Partner:     Emotionally Abused:     Physically Abused:     Sexually Abused:        SCREENINGS                        PHYSICAL EXAM    (up to 7 for level 4, 8 or more for level 5)     ED Triage Vitals   BP Temp Temp src Pulse Resp SpO2 Height Weight   08/23/21 2030 08/23/21 2028 -- 08/23/21 2028 08/23/21 2028 08/23/21 2028 -- --   (!) 153/81 99.3 °F (37.4 °C)  86 19 100 %         Physical Exam  Vitals and nursing note reviewed. Constitutional:       General: She is not in acute distress. Appearance: She is not toxic-appearing. Comments: Patient is sitting in bed comfortably upon exam.  She answers questions appropriately in full sentences. Eyes:      Extraocular Movements: Extraocular movements intact. Pupils: Pupils are equal, round, and reactive to light. Cardiovascular:      Rate and Rhythm: Normal rate and regular rhythm.    Pulmonary: Effort: Pulmonary effort is normal. No respiratory distress. Breath sounds: Normal breath sounds. No wheezing or rhonchi. Abdominal:      General: Bowel sounds are normal.      Palpations: Abdomen is soft. Musculoskeletal:      Cervical back: Normal range of motion and neck supple. Skin:     General: Skin is warm and dry. Capillary Refill: Capillary refill takes less than 2 seconds. Neurological:      General: No focal deficit present. Mental Status: She is alert. Psychiatric:         Mood and Affect: Mood normal.         DIAGNOSTIC RESULTS     EKG: All EKG's are interpreted by the Emergency Department Physician who either signs or Co-signs this chart in the absence of a cardiologist.        RADIOLOGY:   Non-plain film images such as CT, Ultrasound and MRI are read by the radiologist. Plain radiographic images are visualized and preliminarily interpreted by the emergency physician with the below findings:        Interpretation per the Radiologist below, if available at the time of this note:    XR CHEST PORTABLE   Final Result   No evidence of acute cardiopulmonary disease. ED BEDSIDE ULTRASOUND:   Performed by ED Physician - none    LABS:  Labs Reviewed   COVID-19, RAPID - Abnormal; Notable for the following components:       Result Value    SARS-CoV-2, Rapid DETECTED (*)     All other components within normal limits       All other labs were within normal range or not returned as of this dictation.     EMERGENCY DEPARTMENT COURSE and DIFFERENTIAL DIAGNOSIS/MDM:   Vitals:    Vitals:    08/23/21 2028 08/23/21 2030   BP:  (!) 153/81   Pulse: 86    Resp: 19    Temp: 99.3 °F (37.4 °C)    SpO2: 100%            MDM  Number of Diagnoses or Management Options  COVID-19: new and requires workup     Amount and/or Complexity of Data Reviewed  Clinical lab tests: reviewed and ordered  Tests in the radiology section of CPT®: reviewed and ordered  Tests in the medicine section of CPT®: ordered and reviewed  Review and summarize past medical records: yes  Independent visualization of images, tracings, or specimens: yes    Risk of Complications, Morbidity, and/or Mortality  Presenting problems: minimal  Diagnostic procedures: minimal  Management options: minimal    Critical Care  Total time providing critical care: < 30 minutes    Patient Progress  Patient progress: improved        REASSESSMENT     ED Course as of Aug 24 0131   Mon Aug 23, 2021   2057 Dalton Anguiano is a 49-year-old female who presents with URI-like symptoms for 1 week. Patient was seen in urgent care yesterday and diagnosed with a sinus infection without work-up and placed on Augmentin. She states since then her symptoms have gotten worse including a dry cough, chest pain and congestion. She denies any fevers, vomiting or diarrhea. She has no sick contacts. She has no underlying lung disease but does have cardiac stents. She did not get her COVID-19 vaccinations. Upon arrival the patient is hypertensive. Physical exam is grossly unremarkable. A Covid swab and chest x-ray are ordered. Decadron is ordered for patient comfort.    [AB]   2133 Covid positive.    [AB]   2153 At reevaluation the patient is updated on the Covid status and plan to discharge with self quarantine. She is counseled to also have her  self quarantine. Patient is agreeable plan and all questions were answered. Strict return precautions were given.    [AB]      ED Course User Index  [AB] Taylor Breath, DO         CRITICAL CARE TIME   Total Critical Care time was 0 minutes, excluding separately reportable procedures. There was a high probability of clinically significant/life threatening deterioration in the patient's condition which required my urgent intervention.       CONSULTS:  None    PROCEDURES:  Unless otherwise noted below, none     Procedures        FINAL IMPRESSION      1. COVID-19 New Problem         DISPOSITION/PLAN   DISPOSITION Decision To Discharge 08/23/2021 09:40:12 PM      PATIENT REFERRED TO:  [facility] Jimenezelisabeth [address] [place] [id number] [phone]    Schedule an appointment as soon as possible for a visit         DISCHARGE MEDICATIONS:  Discharge Medication List as of 8/23/2021 10:00 PM        Controlled Substances Monitoring:     No flowsheet data found.     (Please note that portions of this note were completed with a voice recognition program.  Efforts were made to edit the dictations but occasionally words are mis-transcribed.)    Hoa King DO (electronically signed)  Attending Emergency Physician            Hoa King DO  08/24/21 0131

## 2021-08-24 NOTE — CARE COORDINATION
Patient contacted regarding COVID-19 diagnosis. Discussed COVID-19 related testing which was available at this time. Test results were positive. Patient informed of results, if available? Yes. Ambulatory Care Manager contacted the patient by telephone to perform post discharge assessment. Call within 2 business days of discharge: Yes. Verified name and  with patient as identifiers. Provided introduction to self, and explanation of the CTN/ACM role, and reason for call due to risk factors for infection and/or exposure to COVID-19. Symptoms reviewed with patient who verbalized the following symptoms: chest pain and congestion. Due to no new or worsening symptoms encounter was not routed to provider for escalation. Discussed follow-up appointments. If no appointment was previously scheduled, appointment scheduling offered: Yes. Patient has appointment scheduled  [facility] follow up appointment(s):   Future Appointments   Date Time Provider Tiffanie Lim   10/14/2021  8:20 AM Shelia Bullock MD TIFF CARD [facility] follow up appointment(s):      Non-face-to-face services provided:  Reviewed and followed up on pending diagnostic tests and treatments-Covid-19 testing     Advance Care Planning:   Does patient have an Advance Directive:  reviewed and current. Educated patient about risk for severe COVID-19 due to risk factors according to CDC guidelines. ACM reviewed discharge instructions, medical action plan and red flag symptoms with the patient who verbalized understanding. Discussed COVID vaccination status: Yes. Education provided on COVID-19 vaccination as appropriate. Discussed exposure protocols and quarantine with CDC Guidelines. Patient was given an opportunity to verbalize any questions and concerns and agrees to contact ACM or health care provider for questions related to their healthcare.     Reviewed and educated patient on any new and changed medications related to discharge diagnosis     Was patient discharged with a pulse oximeter? No Discussed and confirmed pulse oximeter discharge instructions and when to notify provider or seek emergency care. AC provided contact information. Plan for follow-up call in 5-7 days based on severity of symptoms and risk factors. Steps to help prevent the spread of COVID-19 if you are sick  SOURCE - [email or web address].info/. html     Stay home except to get medical care   ; Stay home: People who are mildly ill with COVID-19 are able to isolate at home during their illness. You should restrict activities outside your home, except for getting medical care.   ; Avoid public areas: Do not go to work, school, or public areas.   ; Avoid public transportation: Avoid using public transportation, ride-sharing, or taxis.  ; Separate yourself from other people and animals in your home   ; Stay away from others: As much as possible, you should stay in a specific room and away from other people in your home. Also, you should use a separate bathroom, if available.   ; Limit contact with pets & animals: You should restrict contact with pets and other animals while you are sick with COVID-19, just like you would around other people. Although there have not been reports of pets or other animals becoming sick with COVID-19, it is still recommended that people sick with COVID-19 limit contact with animals until more information is known about the virus. ; When possible, have another member of your household care for your animals while you are sick. If you are sick with COVID-19, avoid contact with your pet, including petting, snuggling, being kissed or licked, and sharing food. If you must care for your pet or be around animals while you are sick, wash your hands before and after you interact with pets and wear a facemask. See COVID-19 and Animals for more information.        Other considerations   The ill person should eat/be fed in their room if possible. Non-disposable  items used should be handled with gloves and washed with hot water or in a . Clean hands after handling used  items.  If possible, dedicate a lined trash can for the ill person. Use gloves when removing garbage bags, handling, and disposing of trash. Wash hands after handling or disposing of trash.  Consider consulting with your local health department about trash disposal guidance if available. Information for Household Members and Caregivers of Someone who is Sick   Call ahead before visiting your doctor   Call ahead: If you have a medical appointment, call the healthcare provider and tell them that you have or may have COVID-19. This will help the healthcare provider's office take steps to keep other people from getting infected or exposed. Wear a facemask if you are sick   ; If you are sick: You should wear a facemask when you are around other people (e.g., sharing a room or vehicle) or pets and before you enter a healthcare provider's office. ; If you are caring for others: If the person who is sick is not able to wear a facemask (for example, because it causes trouble breathing), then people who live with the person who is sick should not stay in the same room with them, or they should wear a facemask if they enter a room with the person who is sick. Cover your coughs and sneezes   ; Cover: Cover your mouth and nose with a tissue when you cough or sneeze.   ; Dispose: Throw used tissues in a lined trash can.   ; Wash hands: Immediately wash your hands with soap and water for at least 20 seconds or, if soap and water are not available, clean your hands with an alcohol-based hand  that contains at least 60% alcohol. Clean your hands often   ;  Wash hands: Wash your hands often with soap and water for at least 20 seconds, especially after blowing your nose, coughing, or sneezing; going to the bathroom; and before eating or preparing food.   ; Hand : If soap and water are not readily available, use an alcohol-based hand  with at least 60% alcohol, covering all surfaces of your hands and rubbing them together until they feel dry.   ; Soap and water: Soap and water are the best option if hands are visibly dirty.   ; Avoid touching: Avoid touching your eyes, nose, and mouth with unwashed hands. Handwashing Tips   ; Wet your hands with clean, running water (warm or cold), turn off the tap, and apply soap.  ; Lather your hands by rubbing them together with the soap. Lather the backs of your hands, between your fingers, and under your nails. ; Scrub your hands for at least 20 seconds. Need a timer? Hum the [place] from beginning to end twice.  ; Rinse your hands well under clean, running water.  ; Dry your hands using a clean towel or air dry them. Avoid sharing personal household items   ; Do not share: You should not share dishes, drinking glasses, cups, eating utensils, towels, or bedding with other people or pets in your home.   ; Wash thoroughly after use: After using these items, they should be washed thoroughly with soap and water. Clean all high-touch surfaces everyday   ; Clean and disinfect: Practice routine cleaning of high touch surfaces.  ; High touch surfaces include counters, tabletops, doorknobs, bathroom fixtures, toilets, phones, keyboards, tablets, and bedside tables.  ; Disinfect areas with bodily fluids: Also, clean any surfaces that may have blood, stool, or body fluids on them.   ; Household : Use a household cleaning spray or wipe, according to the label instructions. Labels contain instructions for safe and effective use of the cleaning product including precautions you should take when applying the product, such as wearing gloves and making sure you have good ventilation during use of the product.     Monitor your symptoms   Seek medical attention: Seek prompt medical attention if your illness is worsening     (e.g., difficulty breathing).   ; Call your doctor: Before seeking care, call your healthcare provider and tell them that you have, or are being evaluated for, COVID-19.   ; Wear a facemask when sick: Put on a facemask before you enter the facility. These steps will help the healthcare provider's office to keep other people in the office or waiting room from getting infected or exposed. ; Alert health department: Ask your healthcare provider to call the local or state health department. Persons who are placed under active monitoring or facilitated self-monitoring should follow instructions provided by their local health department or occupational health professionals, as appropriate.  ; Call 911 if you have a medical emergency: If you have a medical emergency and need to call 911, notify the dispatch personnel that you have, or are being evaluated for COVID-19. If possible, put on a facemask before emergency medical services arrive.

## 2021-08-31 ENCOUNTER — CARE COORDINATION (OUTPATIENT)
Dept: CARE COORDINATION | Age: 61
End: 2021-08-31

## 2021-08-31 NOTE — CARE COORDINATION
Week 2 ED follow up call attempted for Covid-19 screen. Patient was not available. Message left requesting return call. Call back info provided. Will attempt follow up with patient next week if no response received.

## 2021-10-14 ENCOUNTER — OFFICE VISIT (OUTPATIENT)
Dept: CARDIOLOGY | Age: 61
End: 2021-10-14
Payer: COMMERCIAL

## 2021-10-14 VITALS
OXYGEN SATURATION: 98 % | WEIGHT: 183.6 LBS | RESPIRATION RATE: 18 BRPM | HEIGHT: 67 IN | HEART RATE: 71 BPM | SYSTOLIC BLOOD PRESSURE: 134 MMHG | DIASTOLIC BLOOD PRESSURE: 75 MMHG | BODY MASS INDEX: 28.82 KG/M2

## 2021-10-14 DIAGNOSIS — Z86.16 HISTORY OF COVID-19: ICD-10-CM

## 2021-10-14 DIAGNOSIS — E78.2 MIXED HYPERLIPIDEMIA: ICD-10-CM

## 2021-10-14 DIAGNOSIS — Z95.820 S/P ANGIOPLASTY WITH STENT: ICD-10-CM

## 2021-10-14 DIAGNOSIS — I25.10 ASHD (ARTERIOSCLEROTIC HEART DISEASE): Primary | ICD-10-CM

## 2021-10-14 PROCEDURE — 99213 OFFICE O/P EST LOW 20 MIN: CPT | Performed by: INTERNAL MEDICINE

## 2021-10-14 NOTE — PROGRESS NOTES
Reginald Montes am scribing for and in the presence of Taras Goldmann, MD, F.A.C.C. Patient: Marcio Rangel  : 1960  Date of Visit: 2021    REASON FOR VISIT / CONSULTATION:   Chief Complaint   Patient presents with    Follow-up     Hx: ASHD, HLD. Pt is here for a 1 year f/u. had covid . denies: CP, SOB, dizziness, lightheaded, palps     Dear Dr Elene Paget,    I had the pleasure of seeing Marcio Rangel today who comes in for follow up of her coronary artery disease     She is s/p PCI with HITESH to left anterior descending coronary artery and LCx in 2013 for unstable angina. Symptoms prior to her PCI included recurrent chest pain. She did well since her PCI. Working full time. Compliant with medications. History of intolerance to Crestor causing muscle pain. Tolerating Lipitor without significant problem. She has no history of MI or CHF. History of loss of consciousness on 19 after dinner while doing dishes, her fiance caught her and when she woke up her heart was rapidly beating. She denied any chest pain, vomiting, seizure activity, loss of bladder or bowel continence. She also denied biting her tongue. After she woke up, her fiancé took her blood pressure 109/50 and Blood Sugar was 149. Tilt Table Test done on 2019- Abnormal head upright tilt table study. During the initial 7 minutes of the study, the patient's blood pressure ranged from a high of 121/76 mmHg to a low of 67/34 mmHg, while her heart rate ranged from a low of 66/minute to a high of 90/minute. During this period, the patient reported moderate lightheadedness, vision changes, feeling as if she were going to pass out. Echo done on 2019- EF>60%. Normal Study. .  Stress Test done on 2019- excellent exercise tolerance, exercise duration 9 minutes and 41 seconds. No chest pain at peak exercise or during recovery.   Ischemic ECG changes noted but myocardial perfusion was normal. Overall, these results are most consistent with a low risk scan. EKG done in office 4/7/2020- Shows normal sinus rhythm. No acute ischemic changes    Holter monitor 4/10/2020-1. The rhythm was sinus. Average FL interval 0.16, average QRS duration 0.09. Average daily heart rate 85 ranging from 54 to 134 bpm.2. Rare premature supraventricular ectopic beats total 7 isolated PACs. 3. Rare premature ventricular ectopic beats total 32 isolated PVCs. 4. Diary entries of \"treadmill\" and \"anxiety\".  One Patient Event; no ectopy noted. Sinus rhythm with rare isolated PVCs and PACs, otherwise unremarkable Holter study. EKG done in office (2/22/2021): Normal sinus rhythm with a HR of 80 bpm.    Ms. Lyla Berrios is here today for a follow up. She is doing well but states her  has been in the hospital for 18 days this month because of respiratory issues prodrome to having Covid. She did have Covid in August and had mostly sinus and respiratory issues. She still has a lingering cough but is being treated by Dr Filemon Ingram with [facility] and [facility]. Unfortunately her  is currently in the hospital because of COVID-19 pneumonia for the second time. She said she is staying home to take care of him. He is doing better and expected to be discharged today. No significant dizziness or lightheadedness. No chest pain, pressure or tightness. No significant shortness of breath. No problems with medications. She staying very active with no significant exertional symptoms. Past Medical History:   Diagnosis Date    Abnormal cardiac cath 10/3/13    LMCA:  Mild [id number]%. LAD:  Abnormal.  Hazy 60-70% proximal LAD stenosis at the bifurcation of a moderate sized septal peforator vessel. LCx:  Abnormal.  80-90% proximal circumflex stenosis. .  RCA:  Abnormal.  90% ostial stenosis in a relatively small and distal acute marginal branch of the RCA.     Angina pectoris ([address]     CAD (coronary artery disease), native coronary artery 10/3/13    90% Proximal Cx. 70% proximal LAD. 80% small marginal branch stenosis of the RCA.  Esophageal reflux     H/O cardiovascular stress test 9/20/13    Probably normal myocardial perfusion imaging with soft tissue artifact but without evidence of significant MI or infarction. LV systolic function was normal without regional wall motion abnormalities. EF 66%. significant electrocardiographic evidence of MI during EKG monitoring without significant associated arrhythmias. Maximum ST depression was 1.75 mm in lead V4. Duke Treadmill score is 0.    H/O cardiovascular stress test 9/20/13    which correlates with an intermediate risk for CAD.     H/O cardiovascular stress test 04/24/2019    Largerly normal myocardial perfusion imaging with soft tissue artifact but without evidence of signficnat myocardial ischemia or infarction. EF 68%. The pt duke treadmill score is 0, which correlates with a low risk for significant CAD. Overall, these results are most consistent with a low risk scan.  H/O echocardiogram 04/24/2019    EF>60% Normal study    History of echocardiogram 05/18/15    EF 60%. LV cavity size is normal. No wall motion abnormalities or valvular abnormalities. No evidence of diastolic dysfunction was seen.  History of stress test 04/24/2019    Largely normal myocardial perfusion imagaing with soft tissue artifact but without evidence of significant myocardial ischemia    Hx of tilt table evaluation 04/24/2019    Abnormal head upright tilt table study HR BP response and symptoms were most consistent neurocardiogenic dysfunction     Hypercholesterolemia     Impaired fasting glucose     S/P angioplasty 10/4/13    Drug Eluting Stent. LAD and/or branches, CX and /or branches. .     Social History     Tobacco Use    Smoking status: Never Smoker    Smokeless tobacco: Never Used   Vaping Use    Vaping Use: Never used   Substance Use Topics    Alcohol use: No    Drug use:  No No significant family history of recurrent syncope       Current Outpatient Medications:     ezetimibe (ZETIA) 10 MG tablet, Take 1 tablet by mouth once daily, Disp: 90 tablet, Rfl: 3    metoprolol succinate (TOPROL XL) 25 MG extended release tablet, Take 1/2 (one-half) tablet by mouth once daily, Disp: 45 tablet, Rfl: 3    atorvastatin (LIPITOR) 80 MG tablet, Take 1 tablet by mouth once daily, Disp: 90 tablet, Rfl: 3    clopidogrel (PLAVIX) 75 MG tablet, Take 1 tablet by mouth daily, Disp: 90 tablet, Rfl: 3    Cranberry 1000 MG CAPS, Take by mouth, Disp: , Rfl:     Nutritional Supplements (WOMENS HEALTH SUPPORT PO), Take by mouth, Disp: , Rfl:     nitroGLYCERIN (NITROSTAT) 0.4 MG SL tablet, Place 1 tablet under the tongue every 5 minutes as needed (Take when needed), Disp: 25 tablet, Rfl: 1    aspirin 81 MG tablet, Take 81 mg by mouth daily. , Disp: , Rfl:     ondansetron (ZOFRAN ODT) 4 MG disintegrating tablet, Take 1 tablet by mouth every 8 hours as needed for Nausea or Vomiting (Patient not taking: Reported on 10/14/2021), Disp: 12 tablet, Rfl: 0     Allergies   Allergen Reactions    Crestor [Rosuvastatin Calcium] Other (See Comments)     Body aches     ROS: 14 systems reviewed and negative except for pertinent positives as noted above. Vitals:    10/14/21 1307   BP: 134/75   Pulse: 71   Resp: 18   SpO2: 98%      Body mass index is 28.75 kg/m². Constitutional: She is oriented to person, place, and time. She appears well-developed and well-nourished. In no acute distress. HEENT: Normocephalic and atraumatic. No JVD present. Carotid bruit is not present. No mass and no thyromegaly present. No lymphadenopathy present. Cardiovascular: Normal rate, regular rhythm, normal heart sounds. Exam reveals no gallop and no friction rubs. No murmur was heard. Pulmonary/Chest: Effort normal and breath sounds normal. No respiratory distress. She has no wheezes, rhonchi or rales.  Abdominal: Soft, non-tender. Bowel sounds and aorta are normal. She exhibits no organomegaly, mass or bruit. Extremities: None. No cyanosis or clubbing. 2+ radial and carotid pulses. Distal extremity pulses: 2+ bilaterally. Neurological: She is alert and oriented to person, place, and time. No evidence of gross cranial nerve deficit. Coordination appeared normal.   Skin: Skin is warm and dry. There is no rash or diaphoresis. Psychiatric: She has a normal mood and affect. Her speech is normal and behavior is normal.      ASSESSMENT:    Diagnosis Orders   1. ASHD (arteriosclerotic heart disease)  Lipid Panel   2. Mixed hyperlipidemia  Lipid Panel   3. History of COVID-19     4. S/P angioplasty with stent       PLAN:    Atherosclerotic Heart Disease: S/P Stent 2013  Antiplatelet Agent: Continue Aspirin 81 mg daily and clopidogrel (Plavix) 75 mg daily. I also reminded her to watch for signs of blood in her stool or black tarry stools and stop the medication immediately if this develops as this could be life threatening. Beta Blocker: Continue metoprolol succinate (Toprol XL) 12.5 mg nightly  I also discussed the potential side effects of this medication including lightheadedness and dizziness and told her to stop the medication of this occurs and call our office if this occurs. Anti-anginal medications: Continue nitroglycerin 0.4 mg tablets as needed for chest pain. Statin Therapy: Continue atorvastatin (Lipitor) 80 mg nightly. Counseled patient extensively to come to the emergency room if she has any chest pain or worsening shortness of breath. · Hyperlipidemia: Mixed, LDL 89 mg/dL 10/7/2020   · Statin Therapy: Continue atorvastatin (Lipitor) 80 mg nightly. · Continue Zetia 10 mg daily. · I ordered a lipid panel to be done to assess LDL level. Finally, I recommended that she continue her other medications and follow up with you as previously scheduled. FOLLOW UP:  I told Ms. Lendell Schilder to call my office if she had any problems, but otherwise told her to Return in about 1 year (around 10/14/2022). However, I would be happy to see her sooner should the need arise. Sincerely,  Clive [address], [address] [place] [address]  Phone: [phone]; Fax: [phone]    I believe that the risk of significant morbidity and mortality related to the patient's current medical conditions are: Intermediate. >15 minutes were spent during prep work, discussion and exam of the patient, and follow up documentation and all of their questions were answered. The documentation recorded by the scribe, accurately and completely reflects the services I personally performed and the decisions made by me. Amparo Reyes MD, F.A.C.C.  October 14, 2021

## 2021-10-14 NOTE — PATIENT INSTRUCTIONS
SURVEY:    You may be receiving a survey from [employer] regarding your visit today. Please complete the survey to enable us to provide the highest quality of care to you and your family. If you cannot score us a very good on any question, please call the office to discuss how we could have made your experience a very good one. Thank you.

## 2021-10-19 ENCOUNTER — HOSPITAL ENCOUNTER (OUTPATIENT)
Age: 61
Discharge: HOME | End: 2021-10-19
Payer: COMMERCIAL

## 2021-10-19 DIAGNOSIS — I25.10 ASHD (ARTERIOSCLEROTIC HEART DISEASE): ICD-10-CM

## 2021-10-19 DIAGNOSIS — E78.2 MIXED HYPERLIPIDEMIA: ICD-10-CM

## 2021-10-19 LAB
CHOLESTEROL/HDL RATIO: 4.6
CHOLESTEROL: 192 MG/DL
HDLC SERPL-MCNC: 42 MG/DL
LDL CHOLESTEROL: 117 MG/DL (ref 0–130)
TRIGL SERPL-MCNC: 166 MG/DL
VLDLC SERPL CALC-MCNC: ABNORMAL MG/DL (ref 1–30)

## 2021-10-19 PROCEDURE — 36415 COLL VENOUS BLD VENIPUNCTURE: CPT

## 2021-10-19 PROCEDURE — 80061 LIPID PANEL: CPT

## 2021-10-30 DIAGNOSIS — I25.10 ASHD (ARTERIOSCLEROTIC HEART DISEASE): Primary | ICD-10-CM

## 2021-11-01 ENCOUNTER — TELEPHONE (OUTPATIENT)
Dept: CARDIOLOGY | Age: 61
End: 2021-11-01

## 2021-11-01 NOTE — TELEPHONE ENCOUNTER
----- Message from Natalia Menjivar MD sent at 10/30/2021  6:06 PM EDT -----  Cholesterol is higher than before. Please make sure she is taking her Lipitor and Zetia regularly. Will repeat lipid panel in 2 months, if remained elevated we have to add another medicine \" an injection every 2 weeks\".  Thank you

## 2022-05-20 RX ORDER — EZETIMIBE 10 MG/1
TABLET ORAL
Qty: 90 TABLET | Refills: 3 | Status: SHIPPED | OUTPATIENT
Start: 2022-05-20

## 2022-08-22 DIAGNOSIS — E78.5 HYPERLIPIDEMIA WITH TARGET LDL LESS THAN 100: ICD-10-CM

## 2022-08-22 DIAGNOSIS — I25.10 CORONARY ARTERY DISEASE INVOLVING NATIVE CORONARY ARTERY OF NATIVE HEART WITHOUT ANGINA PECTORIS: ICD-10-CM

## 2022-08-22 DIAGNOSIS — Z95.820 S/P ANGIOPLASTY WITH STENT: ICD-10-CM

## 2022-08-22 RX ORDER — ATORVASTATIN CALCIUM 80 MG/1
80 TABLET, FILM COATED ORAL DAILY
Qty: 90 TABLET | Refills: 3 | Status: SHIPPED | OUTPATIENT
Start: 2022-08-22 | End: 2022-10-13 | Stop reason: ALTCHOICE

## 2022-08-22 RX ORDER — METOPROLOL SUCCINATE 25 MG/1
25 TABLET, EXTENDED RELEASE ORAL SEE ADMIN INSTRUCTIONS
Qty: 45 TABLET | Refills: 3 | Status: SHIPPED | OUTPATIENT
Start: 2022-08-22

## 2022-08-22 RX ORDER — METOPROLOL SUCCINATE 25 MG/1
25 TABLET, EXTENDED RELEASE ORAL SEE ADMIN INSTRUCTIONS
COMMUNITY
End: 2022-08-22 | Stop reason: SDUPTHER

## 2022-10-12 ENCOUNTER — HOSPITAL ENCOUNTER (OUTPATIENT)
Age: 62
Discharge: HOME | End: 2022-10-12
Payer: COMMERCIAL

## 2022-10-12 ENCOUNTER — OFFICE VISIT (OUTPATIENT)
Dept: CARDIOLOGY | Age: 62
End: 2022-10-12
Payer: COMMERCIAL

## 2022-10-12 VITALS
HEART RATE: 68 BPM | OXYGEN SATURATION: 97 % | SYSTOLIC BLOOD PRESSURE: 122 MMHG | DIASTOLIC BLOOD PRESSURE: 71 MMHG | BODY MASS INDEX: 30.16 KG/M2 | WEIGHT: 181 LBS | HEIGHT: 65 IN | RESPIRATION RATE: 18 BRPM

## 2022-10-12 DIAGNOSIS — Z95.820 S/P ANGIOPLASTY WITH STENT: ICD-10-CM

## 2022-10-12 DIAGNOSIS — E78.2 MIXED HYPERLIPIDEMIA: ICD-10-CM

## 2022-10-12 DIAGNOSIS — I25.10 ASHD (ARTERIOSCLEROTIC HEART DISEASE): ICD-10-CM

## 2022-10-12 DIAGNOSIS — I25.10 ASHD (ARTERIOSCLEROTIC HEART DISEASE): Primary | ICD-10-CM

## 2022-10-12 LAB
ANION GAP SERPL CALCULATED.3IONS-SCNC: 7 MMOL/L (ref 9–17)
BUN BLDV-MCNC: 17 MG/DL (ref 8–23)
BUN/CREAT BLD: 25 (ref 9–20)
CALCIUM SERPL-MCNC: 9.7 MG/DL (ref 8.6–10.4)
CHLORIDE BLD-SCNC: 103 MMOL/L (ref 98–107)
CHOLESTEROL/HDL RATIO: 5.2
CHOLESTEROL: 198 MG/DL
CO2: 28 MMOL/L (ref 20–31)
CREAT SERPL-MCNC: 0.67 MG/DL (ref 0.5–0.9)
GFR SERPL CREATININE-BSD FRML MDRD: >60 ML/MIN/1.73M2
GLUCOSE BLD-MCNC: 101 MG/DL (ref 70–99)
HCT VFR BLD CALC: 39.3 % (ref 36.3–47.1)
HDLC SERPL-MCNC: 38 MG/DL
HEMOGLOBIN: 13.2 G/DL (ref 11.9–15.1)
LDL CHOLESTEROL: 105 MG/DL (ref 0–130)
MCH RBC QN AUTO: 31.4 PG (ref 25.2–33.5)
MCHC RBC AUTO-ENTMCNC: 33.6 G/DL (ref 28.4–34.8)
MCV RBC AUTO: 93.3 FL (ref 82.6–102.9)
NRBC AUTOMATED: 0 PER 100 WBC
PDW BLD-RTO: 12.7 % (ref 11.8–14.4)
PLATELET # BLD: 240 K/UL (ref 138–453)
PMV BLD AUTO: 9.3 FL (ref 8.1–13.5)
POTASSIUM SERPL-SCNC: 3.6 MMOL/L (ref 3.7–5.3)
RBC # BLD: 4.21 M/UL (ref 3.95–5.11)
SODIUM BLD-SCNC: 138 MMOL/L (ref 135–144)
TRIGL SERPL-MCNC: 275 MG/DL
WBC # BLD: 5 K/UL (ref 3.5–11.3)

## 2022-10-12 PROCEDURE — 99213 OFFICE O/P EST LOW 20 MIN: CPT | Performed by: INTERNAL MEDICINE

## 2022-10-12 PROCEDURE — 80048 BASIC METABOLIC PNL TOTAL CA: CPT

## 2022-10-12 PROCEDURE — 80061 LIPID PANEL: CPT

## 2022-10-12 PROCEDURE — 93000 ELECTROCARDIOGRAM COMPLETE: CPT | Performed by: INTERNAL MEDICINE

## 2022-10-12 PROCEDURE — 36415 COLL VENOUS BLD VENIPUNCTURE: CPT

## 2022-10-12 PROCEDURE — 85027 COMPLETE CBC AUTOMATED: CPT

## 2022-10-12 RX ORDER — NITROGLYCERIN 0.4 MG/1
0.4 TABLET SUBLINGUAL EVERY 5 MIN PRN
Qty: 25 TABLET | Refills: 1 | Status: SHIPPED | OUTPATIENT
Start: 2022-10-12

## 2022-10-12 NOTE — PROGRESS NOTES
Shilpi Mcclellan am scribing for and in the presence of Marty Harrison MD, F.A.C.C. Patient: Geremias Wise  : 1960  Date of Visit: 2022    REASON FOR VISIT / CONSULTATION:   Chief Complaint   Patient presents with    Follow-up     Hx: ASHD, HLD. Pt states she is doing well. Denies: Cp, Palpitations, Lightheaded/ dizziness, SOB. Dear Maty Talbert MD,    I had the pleasure of seeing Geremias Wise today who comes in for follow up of her coronary artery disease     She is s/p PCI with HITESH to left anterior descending coronary artery and LCx in 2013 for unstable angina. Symptoms prior to her PCI included recurrent chest pain. She did well since her PCI. Working full time. Compliant with medications. History of intolerance to Crestor causing muscle pain. Tolerating Lipitor without significant problem. She has no history of MI or CHF. History of loss of consciousness on 19 after dinner while doing dishes, her fiance caught her and when she woke up her heart was rapidly beating. She denied any chest pain, vomiting, seizure activity, loss of bladder or bowel continence. She also denied biting her tongue. After she woke up, her fiancé took her blood pressure 109/50 and Blood Sugar was 149. Tilt Table Test done on 2019- Abnormal head upright tilt table study. During the initial 7 minutes of the study, the patient's blood pressure ranged from a high of 121/76 mmHg to a low of 67/34 mmHg, while her heart rate ranged from a low of 66/minute to a high of 90/minute. During this period, the patient reported moderate lightheadedness, vision changes, feeling as if she were going to pass out. Echo done on 2019- EF>60%. Normal Study. .  Stress Test done on 2019- excellent exercise tolerance, exercise duration 9 minutes and 41 seconds. No chest pain at peak exercise or during recovery.   Ischemic ECG changes noted but myocardial perfusion was normal. Overall, these results are most consistent with a low risk scan. EKG done in office 4/7/2020- Shows normal sinus rhythm. No acute ischemic changes    Holter monitor 4/10/2020-1. The rhythm was sinus. Average CO interval 0.16, average QRS duration 0.09. Average daily heart rate 85 ranging from 54 to 134 bpm.2. Rare premature supraventricular ectopic beats total 7 isolated PACs. 3. Rare premature ventricular ectopic beats total 32 isolated PVCs. 4. Diary entries of \"treadmill\" and \"anxiety\". One Patient Event; no ectopy noted. Sinus rhythm with rare isolated PVCs and PACs, otherwise unremarkable Holter study. EKG done in office (2/22/2021): Normal sinus rhythm with a HR of 80 bpm.    Ms. Vaibhav Clement is here today for a follow up. She is doing well over the past year. She is physical at work. She works at [employer] four days a week. She denied any ER visits, hospitalization or minor procedures. She denies any chest pain, pressure or tightness. No heart palpitations or lightheaded/ dizziness. Also she denied any syncope episodes since her last visit. Her  is trying to quite smoking and he is negative at this time because of this. He was on 20 mg of the nicotine patch and he stopped it cold turkey so she is trying to help him with this. She did refill this for him however a lower dose to help him and then help her and her attitude. No significant shortness of breath. No problems with medications. She was given Mobic however her pharmacist suggested she not take it and so she has not taking this at all. She was put on this for her back pain however her back pain is better now. EKG done in office (10/12/2022): Showed no ischemic changes. Past Medical History:   Diagnosis Date    Abnormal cardiac cath 10/3/13    LMCA:  Mild [id number]%. LAD:  Abnormal.  Hazy 60-70% proximal LAD stenosis at the bifurcation of a moderate sized septal peforator vessel.   LCx:  Abnormal.  80-90% proximal circumflex stenosis. .  RCA:  Abnormal.  90% ostial stenosis in a relatively small and distal acute marginal branch of the RCA. Angina pectoris ([address]     CAD (coronary artery disease), native coronary artery 10/3/13    90% Proximal Cx. 70% proximal LAD. 80% small marginal branch stenosis of the RCA. Esophageal reflux     H/O cardiovascular stress test 9/20/13    Probably normal myocardial perfusion imaging with soft tissue artifact but without evidence of significant MI or infarction. LV systolic function was normal without regional wall motion abnormalities. EF 66%. significant electrocardiographic evidence of MI during EKG monitoring without significant associated arrhythmias. Maximum ST depression was 1.75 mm in lead V4. Duke Treadmill score is 0. H/O cardiovascular stress test 9/20/13    which correlates with an intermediate risk for CAD. H/O cardiovascular stress test 04/24/2019    Largerly normal myocardial perfusion imaging with soft tissue artifact but without evidence of signficnat myocardial ischemia or infarction. EF 68%. The pt duke treadmill score is 0, which correlates with a low risk for significant CAD. Overall, these results are most consistent with a low risk scan. H/O echocardiogram 04/24/2019    EF>60% Normal study    History of echocardiogram 05/18/15    EF 60%. LV cavity size is normal. No wall motion abnormalities or valvular abnormalities. No evidence of diastolic dysfunction was seen. History of stress test 04/24/2019    Largely normal myocardial perfusion imagaing with soft tissue artifact but without evidence of significant myocardial ischemia    Hx of tilt table evaluation 04/24/2019    Abnormal head upright tilt table study HR BP response and symptoms were most consistent neurocardiogenic dysfunction     Hypercholesterolemia     Impaired fasting glucose     S/P angioplasty 10/4/13    Drug Eluting Stent. LAD and/or branches, CX and /or branches.    .     Social History Tobacco Use    Smoking status: Never    Smokeless tobacco: Never   Vaping Use    Vaping Use: Never used   Substance Use Topics    Alcohol use: No    Drug use: No     No significant family history of recurrent syncope       Current Outpatient Medications:     atorvastatin (LIPITOR) 80 MG tablet, Take 1 tablet by mouth daily, Disp: 90 tablet, Rfl: 3    metoprolol succinate (TOPROL XL) 25 MG extended release tablet, Take 1 tablet by mouth See Admin Instructions Take 1/2 tablet daily, Disp: 45 tablet, Rfl: 3    ezetimibe (ZETIA) 10 MG tablet, Take 1 tablet by mouth once daily, Disp: 90 tablet, Rfl: 3    clopidogrel (PLAVIX) 75 MG tablet, Take 1 tablet by mouth once daily, Disp: 90 tablet, Rfl: 3    Cranberry 1000 MG CAPS, Take by mouth, Disp: , Rfl:     Nutritional Supplements (WOMENS HEALTH SUPPORT PO), Take by mouth, Disp: , Rfl:     nitroGLYCERIN (NITROSTAT) 0.4 MG SL tablet, Place 1 tablet under the tongue every 5 minutes as needed (Take when needed), Disp: 25 tablet, Rfl: 1    aspirin 81 MG tablet, Take 81 mg by mouth daily. , Disp: , Rfl:     ondansetron (ZOFRAN ODT) 4 MG disintegrating tablet, Take 1 tablet by mouth every 8 hours as needed for Nausea or Vomiting (Patient not taking: No sig reported), Disp: 12 tablet, Rfl: 0     Allergies   Allergen Reactions    Crestor [Rosuvastatin Calcium] Other (See Comments)     Body aches     ROS: 14 systems reviewed and negative except for pertinent positives as noted above. Vitals:    10/12/22 0908   BP: 122/71   Pulse: 68   Resp: 18   SpO2: 97%      Body mass index is 30.12 kg/m². Constitutional: She is oriented to person, place, and time. She appears well-developed and well-nourished. In no acute distress. HEENT: Normocephalic and atraumatic. No JVD present. Carotid bruit is not present. No mass and no thyromegaly present. No lymphadenopathy present. Cardiovascular: Normal rate, regular rhythm, normal heart sounds. Exam reveals no gallop and no friction rubs. No murmur was heard. Pulmonary/Chest: Effort normal and breath sounds normal. No respiratory distress. She has no wheezes, rhonchi or rales. Abdominal: Soft, non-tender. Bowel sounds and aorta are normal. She exhibits no organomegaly, mass or bruit. Extremities: None. No cyanosis or clubbing. 2+ radial and carotid pulses. Distal extremity pulses: 2+ bilaterally. Neurological: She is alert and oriented to person, place, and time. No evidence of gross cranial nerve deficit. Coordination appeared normal.   Skin: Skin is warm and dry. There is no rash or diaphoresis. Psychiatric: She has a normal mood and affect. Her speech is normal and behavior is normal.      ASSESSMENT:    Diagnosis Orders   1. ASHD (arteriosclerotic heart disease)  EKG 12 lead      2. S/P angioplasty with stent        3. Mixed hyperlipidemia          PLAN:    Atherosclerotic Heart Disease: S/P Stent 2013  Antiplatelet Agent: Continue Aspirin 81 mg daily and clopidogrel (Plavix) 75 mg daily. I also reminded her to watch for signs of blood in her stool or black tarry stools and stop the medication immediately if this develops as this could be life threatening. Beta Blocker: Continue metoprolol succinate (Toprol XL) 12.5 mg nightly  I also discussed the potential side effects of this medication including lightheadedness and dizziness and told her to stop the medication of this occurs and call our office if this occurs. Anti-anginal medications: Continue nitroglycerin 0.4 mg tablets as needed for chest pain. Cholesterol Reduction Therapy: Continue Atorvastatin (Lipitor) 80 mg daily. and ezetimide (Zetia) 10 mg daily. Counseled patient extensively to come to the emergency room if she has any chest pain or worsening shortness of breath. I took the liberty of ordering a BMP for today to assess their potassium and renal function.  I told them that they could get their lab work performed at the location of their choosing, unfortunately, if the lab work was not performed at a [facility]) facility I could not guarantee my ability to follow up with them on their results. and  I took the liberty of ordering a CBC. I told them that they could get their lab work performed at the location of their choosing, unfortunately, if the lab work was not performed at a [facility]) facility I could not guarantee my ability to follow up with them on their results. Also ordered a repeat Lipid Panel to reassess her LDL at this time. Hyperlipidemia: Mixed,  mg/dL 10/19/2021  Statin Therapy: Continue atorvastatin (Lipitor) 80 mg nightly. And also continue Zetia 10 mg daily. Back Pain:   We did discuss the use of Mobic at this time for her pain. She has not taking it at all due to the pharmacists tell her that it could interact with her other antiplatelet. Also it could damage her kidneys. Her pain is better now so she does not feel she needs it. If she uses short term I am ok with this however if she still can continue to not use it his would be better for her. She verbalized understanding. Finally, I recommended that she continue her other medications and follow up with you as previously scheduled. FOLLOW UP:  I told Ms. Hollis Polk to call my office if she had any problems, but otherwise told her to Return in about 1 year (around 10/12/2023). However, I would be happy to see her sooner should the need arise. Sincerely,  Clive Boyle [address], [address] [place] [address]  Phone: [phone]; Fax: [phone]    I believe that the risk of significant morbidity and mortality related to the patient's current medical conditions are: Intermediate. Approximately 30 minutes were spent during prep work, discussion and exam of the patient, and follow up documentation and all of their questions were answered.     The documentation recorded by the scribe, accurately and completely reflects the services I personally performed and the decisions made by me. Zak Navarro MD, F.A.C.C.  October 12, 2022

## 2022-10-12 NOTE — PATIENT INSTRUCTIONS
SURVEY:    You may be receiving a survey from "[employer]" regarding your visit today. Please complete the survey to enable us to provide the highest quality of care to you and your family. If you cannot score us a very good on any question, please call the office to discuss how we could have made your experience a very good one. Thank you.

## 2022-10-13 RX ORDER — ATORVASTATIN CALCIUM 80 MG/1
80 TABLET, FILM COATED ORAL DAILY
Qty: 90 TABLET | Refills: 3 | Status: SHIPPED | OUTPATIENT
Start: 2022-10-13

## 2022-10-14 ENCOUNTER — TELEPHONE (OUTPATIENT)
Dept: CARDIOLOGY | Age: 62
End: 2022-10-14

## 2022-10-14 NOTE — TELEPHONE ENCOUNTER
----- Message from Josesito Davis MD sent at 10/13/2022  9:41 PM EDT -----  Blood work is stable. Kidney function is normal.  Borderline low potassium, please encourage patient to take high potassium diet including colored fruits and vegetables. Cholesterol is okay but bad cholesterol is not where it supposed to be. Please make sure she is taking both Lipitor and Zetia. We will continue to monitor on follow-up. Please call with questions and/or concerns.   Thank you

## 2022-11-10 RX ORDER — CLOPIDOGREL BISULFATE 75 MG/1
TABLET ORAL
Qty: 60 TABLET | Refills: 3 | Status: SHIPPED | OUTPATIENT
Start: 2022-11-10

## 2023-05-31 RX ORDER — METOPROLOL SUCCINATE 25 MG/1
12.5 TABLET, EXTENDED RELEASE ORAL DAILY
Qty: 45 TABLET | Refills: 3 | Status: SHIPPED | OUTPATIENT
Start: 2023-05-31

## 2023-05-31 RX ORDER — CLOPIDOGREL BISULFATE 75 MG/1
75 TABLET ORAL DAILY
Qty: 90 TABLET | Refills: 3 | Status: SHIPPED | OUTPATIENT
Start: 2023-05-31

## 2023-05-31 RX ORDER — EZETIMIBE 10 MG/1
10 TABLET ORAL DAILY
Qty: 90 TABLET | Refills: 3 | Status: SHIPPED | OUTPATIENT
Start: 2023-05-31

## 2023-10-12 ENCOUNTER — OFFICE VISIT (OUTPATIENT)
Dept: CARDIOLOGY | Age: 63
End: 2023-10-12
Payer: COMMERCIAL

## 2023-10-12 ENCOUNTER — HOSPITAL ENCOUNTER (OUTPATIENT)
Age: 63
Discharge: HOME | End: 2023-10-12
Payer: COMMERCIAL

## 2023-10-12 VITALS
WEIGHT: 182 LBS | BODY MASS INDEX: 30.32 KG/M2 | SYSTOLIC BLOOD PRESSURE: 113 MMHG | OXYGEN SATURATION: 98 % | RESPIRATION RATE: 18 BRPM | HEIGHT: 65 IN | DIASTOLIC BLOOD PRESSURE: 68 MMHG | HEART RATE: 85 BPM

## 2023-10-12 DIAGNOSIS — I25.10 ASHD (ARTERIOSCLEROTIC HEART DISEASE): ICD-10-CM

## 2023-10-12 DIAGNOSIS — G89.29 CHRONIC PAIN OF LEFT KNEE: ICD-10-CM

## 2023-10-12 DIAGNOSIS — Z95.820 S/P ANGIOPLASTY WITH STENT: ICD-10-CM

## 2023-10-12 DIAGNOSIS — E78.2 MIXED HYPERLIPIDEMIA: ICD-10-CM

## 2023-10-12 DIAGNOSIS — M25.562 CHRONIC PAIN OF LEFT KNEE: ICD-10-CM

## 2023-10-12 LAB
ANION GAP SERPL CALCULATED.3IONS-SCNC: 8 MMOL/L (ref 9–17)
BUN SERPL-MCNC: 19 MG/DL (ref 8–23)
BUN/CREAT SERPL: 27 (ref 9–20)
CALCIUM SERPL-MCNC: 9.2 MG/DL (ref 8.6–10.4)
CHLORIDE SERPL-SCNC: 104 MMOL/L (ref 98–107)
CHOLEST SERPL-MCNC: 157 MG/DL
CHOLESTEROL/HDL RATIO: 4.2
CO2 SERPL-SCNC: 25 MMOL/L (ref 20–31)
CREAT SERPL-MCNC: 0.7 MG/DL (ref 0.5–0.9)
ERYTHROCYTE [DISTWIDTH] IN BLOOD BY AUTOMATED COUNT: 12.9 % (ref 11.8–14.4)
GFR SERPL CREATININE-BSD FRML MDRD: >60 ML/MIN/1.73M2
GLUCOSE SERPL-MCNC: 156 MG/DL (ref 70–99)
HCT VFR BLD AUTO: 39 % (ref 36.3–47.1)
HDLC SERPL-MCNC: 37 MG/DL
HGB BLD-MCNC: 12.5 G/DL (ref 11.9–15.1)
LDLC SERPL CALC-MCNC: 95 MG/DL (ref 0–130)
MCH RBC QN AUTO: 30.4 PG (ref 25.2–33.5)
MCHC RBC AUTO-ENTMCNC: 32.1 G/DL (ref 28.4–34.8)
MCV RBC AUTO: 94.9 FL (ref 82.6–102.9)
NRBC BLD-RTO: 0 PER 100 WBC
PLATELET # BLD AUTO: 242 K/UL (ref 138–453)
PMV BLD AUTO: 9.6 FL (ref 8.1–13.5)
POTASSIUM SERPL-SCNC: 4.2 MMOL/L (ref 3.7–5.3)
RBC # BLD AUTO: 4.11 M/UL (ref 3.95–5.11)
SODIUM SERPL-SCNC: 137 MMOL/L (ref 135–144)
TRIGL SERPL-MCNC: 127 MG/DL
WBC OTHER # BLD: 10.7 K/UL (ref 3.5–11.3)

## 2023-10-12 PROCEDURE — 80061 LIPID PANEL: CPT

## 2023-10-12 PROCEDURE — 85027 COMPLETE CBC AUTOMATED: CPT

## 2023-10-12 PROCEDURE — 93000 ELECTROCARDIOGRAM COMPLETE: CPT | Performed by: INTERNAL MEDICINE

## 2023-10-12 PROCEDURE — 36415 COLL VENOUS BLD VENIPUNCTURE: CPT

## 2023-10-12 PROCEDURE — 80048 BASIC METABOLIC PNL TOTAL CA: CPT

## 2023-10-12 RX ORDER — MELOXICAM 15 MG/1
TABLET ORAL
COMMUNITY
Start: 2023-09-24

## 2023-10-13 ENCOUNTER — TELEPHONE (OUTPATIENT)
Dept: CARDIOLOGY | Age: 63
End: 2023-10-13

## 2023-10-13 NOTE — TELEPHONE ENCOUNTER
----- Message from Earl Cavazos PA-C sent at 10/13/2023  9:49 AM EDT -----  Regarding: FW:  Please let her know her LDL was 95, she is not at goal. She is in the top dose of Lipitor and Zetia. I would like to discuss possibly starting an injectable medication relates or praulent in the next several weeks if she is open to it.  Thanks!  ----- Message -----  From: Benedicto Pereira Incoming Lab Results From [address]: 10/12/2023  10:27 AM EDT  To: Earl Cavazos PA-C

## 2023-11-14 RX ORDER — ATORVASTATIN CALCIUM 80 MG/1
80 TABLET, FILM COATED ORAL DAILY
Qty: 90 TABLET | Refills: 3 | Status: SHIPPED | OUTPATIENT
Start: 2023-11-14

## 2024-05-30 RX ORDER — EZETIMIBE 10 MG/1
10 TABLET ORAL DAILY
Qty: 90 TABLET | Refills: 0 | Status: SHIPPED | OUTPATIENT
Start: 2024-05-30

## 2024-06-21 RX ORDER — CLOPIDOGREL BISULFATE 75 MG/1
75 TABLET ORAL DAILY
Qty: 90 TABLET | Refills: 0 | Status: SHIPPED | OUTPATIENT
Start: 2024-06-21

## 2024-07-29 RX ORDER — METOPROLOL SUCCINATE 25 MG/1
TABLET, EXTENDED RELEASE ORAL
Qty: 45 TABLET | Refills: 3 | Status: SHIPPED | OUTPATIENT
Start: 2024-07-29

## 2024-08-23 RX ORDER — EZETIMIBE 10 MG/1
10 TABLET ORAL DAILY
Qty: 90 TABLET | Refills: 3 | Status: SHIPPED | OUTPATIENT
Start: 2024-08-23

## 2024-08-27 RX ORDER — ATORVASTATIN CALCIUM 80 MG/1
80 TABLET, FILM COATED ORAL DAILY
Qty: 90 TABLET | Refills: 3 | Status: SHIPPED | OUTPATIENT
Start: 2024-08-27

## 2024-09-18 RX ORDER — CLOPIDOGREL BISULFATE 75 MG/1
75 TABLET ORAL DAILY
Qty: 90 TABLET | Refills: 3 | Status: SHIPPED | OUTPATIENT
Start: 2024-09-18

## 2024-10-17 ENCOUNTER — HOSPITAL ENCOUNTER (OUTPATIENT)
Age: 64
Discharge: HOME | End: 2024-10-17
Payer: COMMERCIAL

## 2024-10-17 ENCOUNTER — OFFICE VISIT (OUTPATIENT)
Dept: CARDIOLOGY | Age: 64
End: 2024-10-17
Payer: COMMERCIAL

## 2024-10-17 VITALS
OXYGEN SATURATION: 98 % | RESPIRATION RATE: 18 BRPM | DIASTOLIC BLOOD PRESSURE: 73 MMHG | WEIGHT: 177 LBS | HEIGHT: 66 IN | BODY MASS INDEX: 28.45 KG/M2 | SYSTOLIC BLOOD PRESSURE: 129 MMHG | HEART RATE: 71 BPM

## 2024-10-17 DIAGNOSIS — E78.2 MIXED HYPERLIPIDEMIA: ICD-10-CM

## 2024-10-17 DIAGNOSIS — I25.10 ASHD (ARTERIOSCLEROTIC HEART DISEASE): ICD-10-CM

## 2024-10-17 DIAGNOSIS — Z95.820 S/P ANGIOPLASTY WITH STENT: ICD-10-CM

## 2024-10-17 DIAGNOSIS — I25.10 ASHD (ARTERIOSCLEROTIC HEART DISEASE): Primary | ICD-10-CM

## 2024-10-17 LAB
ALT SERPL-CCNC: 33 U/L (ref 10–35)
AST SERPL-CCNC: 25 U/L (ref 10–35)
CHOLEST SERPL-MCNC: 181 MG/DL (ref 0–199)
CHOLESTEROL/HDL RATIO: 4
ERYTHROCYTE [DISTWIDTH] IN BLOOD BY AUTOMATED COUNT: 12.6 % (ref 11.8–14.4)
EST. AVERAGE GLUCOSE BLD GHB EST-MCNC: 146 MG/DL
HBA1C MFR BLD: 6.7 % (ref 4–6)
HCT VFR BLD AUTO: 39.4 % (ref 36.3–47.1)
HDLC SERPL-MCNC: 41 MG/DL
HGB BLD-MCNC: 13.2 G/DL (ref 11.9–15.1)
LDLC SERPL CALC-MCNC: 111 MG/DL (ref 0–100)
MCH RBC QN AUTO: 30.6 PG (ref 25.2–33.5)
MCHC RBC AUTO-ENTMCNC: 33.5 G/DL (ref 28.4–34.8)
MCV RBC AUTO: 91.4 FL (ref 82.6–102.9)
NRBC BLD-RTO: 0 PER 100 WBC
PLATELET # BLD AUTO: 226 K/UL (ref 138–453)
PMV BLD AUTO: 9.5 FL (ref 8.1–13.5)
RBC # BLD AUTO: 4.31 M/UL (ref 3.95–5.11)
TRIGL SERPL-MCNC: 148 MG/DL
VLDLC SERPL CALC-MCNC: 30 MG/DL
WBC OTHER # BLD: 4.7 K/UL (ref 3.5–11.3)

## 2024-10-17 PROCEDURE — 83036 HEMOGLOBIN GLYCOSYLATED A1C: CPT

## 2024-10-17 PROCEDURE — 36415 COLL VENOUS BLD VENIPUNCTURE: CPT

## 2024-10-17 PROCEDURE — 80061 LIPID PANEL: CPT

## 2024-10-17 PROCEDURE — 93000 ELECTROCARDIOGRAM COMPLETE: CPT | Performed by: INTERNAL MEDICINE

## 2024-10-17 PROCEDURE — 84460 ALANINE AMINO (ALT) (SGPT): CPT

## 2024-10-17 PROCEDURE — 85027 COMPLETE CBC AUTOMATED: CPT

## 2024-10-17 PROCEDURE — 99214 OFFICE O/P EST MOD 30 MIN: CPT | Performed by: INTERNAL MEDICINE

## 2024-10-17 PROCEDURE — 84450 TRANSFERASE (AST) (SGOT): CPT

## 2024-10-17 RX ORDER — ATORVASTATIN CALCIUM 80 MG/1
80 TABLET, FILM COATED ORAL DAILY
Qty: 90 TABLET | Refills: 3 | Status: SHIPPED | OUTPATIENT
Start: 2024-10-17

## 2024-10-17 RX ORDER — NITROGLYCERIN 0.4 MG/1
0.4 TABLET SUBLINGUAL EVERY 5 MIN PRN
Qty: 25 TABLET | Refills: 1 | Status: SHIPPED | OUTPATIENT
Start: 2024-10-17

## 2024-10-17 RX ORDER — METFORMIN HCL 500 MG
500 TABLET, EXTENDED RELEASE 24 HR ORAL 2 TIMES DAILY
COMMUNITY
Start: 2024-09-14

## 2024-10-17 RX ORDER — ATORVASTATIN CALCIUM 80 MG/1
80 TABLET, FILM COATED ORAL DAILY
Qty: 90 TABLET | Refills: 3 | Status: SHIPPED | OUTPATIENT
Start: 2024-10-17 | End: 2024-10-17 | Stop reason: ALTCHOICE

## 2024-10-17 NOTE — PROGRESS NOTES
a BMP for today to assess their potassium and renal function. I told them that they could get their lab work performed at the location of their choosing, unfortunately, if the lab work was not performed at a [facility] I could not guarantee my ability to follow up with them on their results.  and  I took the liberty of ordering a CBC. I told them that they could get their lab work performed at the location of their choosing, unfortunately, if the lab work was not performed at a [facility] I could not guarantee my ability to follow up with them on their results.  I also ordered an ALT an AST and an A1C.     Hyperlipidemia: Mixed, LDL 95 mg/dL 10/12/23.  Statin Therapy: Continue atorvastatin (Lipitor) 80 mg nightly.  And also continue Zetia 10 mg daily.   Ordered repeat lipid panel as well.  Counseled her extensively to take her medications as prescribed because her LDL was perfectly controlled before.    Finally, I recommended that she continue her other medications and follow up with you as previously scheduled.          FOLLOW UP:  I told Ms. Hi to call my office if she had any problems, but otherwise told her to Return in about 1 year (around 10/17/2025). However, I would be happy to see her sooner should the need arise.       Sincerely,  Poly Gasca MD, MS, F.A.C.C.  [facility], [place] [id number]  Phone: [phone], Fax: [phone]    I believe that the risk of significant morbidity and mortality related to the patient's current medical conditions are: Intermediate. At least 30 minutes were spent during prep work, discussion and exam of the patient, and follow up documentation and all of their questions were answered.    The documentation recorded by the scribe, accurately and completely reflects the services I personally performed and the decisions made by me. Poly Gasca MD, F.A.C.C. October 17, 2024

## 2024-10-18 ENCOUNTER — TELEPHONE (OUTPATIENT)
Dept: CARDIOLOGY | Age: 64
End: 2024-10-18

## 2024-10-18 NOTE — TELEPHONE ENCOUNTER
Ms. Gibson called back and said she is taking her medications as prescribed at this time.     Thanks!

## 2024-10-18 NOTE — TELEPHONE ENCOUNTER
----- Message from Dr. Poly Gasca MD sent at 10/17/2024  9:07 PM EDT -----  Blood work is stable but the bad cholesterol is not where it should be. Please make sure she is taking her Atorvastatin 80 mg every night and Zetia 10 mg daily regularly.    Continue Metformin, Hemoglobin A1c is 6.7% \"Diabetes is controlled\". Thank you.

## 2025-07-28 RX ORDER — METOPROLOL SUCCINATE 25 MG/1
TABLET, EXTENDED RELEASE ORAL
Qty: 45 TABLET | Refills: 3 | Status: SHIPPED | OUTPATIENT
Start: 2025-07-28

## 2025-08-11 RX ORDER — EZETIMIBE 10 MG/1
10 TABLET ORAL DAILY
Qty: 90 TABLET | Refills: 3 | Status: SHIPPED | OUTPATIENT
Start: 2025-08-11